# Patient Record
Sex: FEMALE | Race: WHITE | Employment: OTHER | URBAN - METROPOLITAN AREA
[De-identification: names, ages, dates, MRNs, and addresses within clinical notes are randomized per-mention and may not be internally consistent; named-entity substitution may affect disease eponyms.]

---

## 2017-05-02 PROBLEM — E78.2 MIXED HYPERLIPIDEMIA: Status: ACTIVE | Noted: 2017-05-02

## 2017-05-03 ENCOUNTER — HOSPITAL ENCOUNTER (OUTPATIENT)
Dept: OTHER | Age: 72
Discharge: HOME | End: 2017-05-03
Attending: INTERNAL MEDICINE | Admitting: INTERNAL MEDICINE

## 2017-05-03 ENCOUNTER — OFFICE VISIT (OUTPATIENT)
Dept: CARDIOLOGY CLINIC | Age: 72
End: 2017-05-03

## 2017-05-03 VITALS
OXYGEN SATURATION: 98 % | SYSTOLIC BLOOD PRESSURE: 200 MMHG | HEIGHT: 65 IN | WEIGHT: 152.08 LBS | DIASTOLIC BLOOD PRESSURE: 120 MMHG | HEART RATE: 72 BPM | BODY MASS INDEX: 25.34 KG/M2

## 2017-05-03 DIAGNOSIS — R00.2 PALPITATIONS: ICD-10-CM

## 2017-05-03 DIAGNOSIS — E78.2 MIXED HYPERLIPIDEMIA: Primary | ICD-10-CM

## 2017-05-03 DIAGNOSIS — I48.20 CHRONIC ATRIAL FIBRILLATION (HCC): ICD-10-CM

## 2017-05-03 DIAGNOSIS — I10 ESSENTIAL HYPERTENSION: ICD-10-CM

## 2017-05-03 DIAGNOSIS — Q21.12 PATENT FORAMEN OVALE: ICD-10-CM

## 2017-05-03 LAB
INR BLD: 1.02 (ref 0.85–1.15)
PROTHROMBIN TIME: 11.8 SEC (ref 9.6–13)

## 2017-05-03 PROCEDURE — 1036F TOBACCO NON-USER: CPT | Performed by: INTERNAL MEDICINE

## 2017-05-03 PROCEDURE — 4040F PNEUMOC VAC/ADMIN/RCVD: CPT | Performed by: INTERNAL MEDICINE

## 2017-05-03 PROCEDURE — 93000 ELECTROCARDIOGRAM COMPLETE: CPT | Performed by: INTERNAL MEDICINE

## 2017-05-03 PROCEDURE — 3017F COLORECTAL CA SCREEN DOC REV: CPT | Performed by: INTERNAL MEDICINE

## 2017-05-03 PROCEDURE — 1090F PRES/ABSN URINE INCON ASSESS: CPT | Performed by: INTERNAL MEDICINE

## 2017-05-03 PROCEDURE — 3014F SCREEN MAMMO DOC REV: CPT | Performed by: INTERNAL MEDICINE

## 2017-05-03 PROCEDURE — G8427 DOCREV CUR MEDS BY ELIG CLIN: HCPCS | Performed by: INTERNAL MEDICINE

## 2017-05-03 PROCEDURE — 99204 OFFICE O/P NEW MOD 45 MIN: CPT | Performed by: INTERNAL MEDICINE

## 2017-05-03 PROCEDURE — G8420 CALC BMI NORM PARAMETERS: HCPCS | Performed by: INTERNAL MEDICINE

## 2017-05-03 RX ORDER — LOSARTAN POTASSIUM 100 MG/1
100 TABLET ORAL DAILY
COMMUNITY
End: 2017-05-03 | Stop reason: ALTCHOICE

## 2017-05-03 RX ORDER — RANITIDINE 300 MG/1
300 TABLET ORAL NIGHTLY
COMMUNITY
End: 2017-12-20 | Stop reason: ALTCHOICE

## 2017-05-03 RX ORDER — METOPROLOL SUCCINATE 50 MG/1
50 TABLET, EXTENDED RELEASE ORAL DAILY
COMMUNITY
End: 2018-06-27

## 2017-05-03 RX ORDER — LOSARTAN POTASSIUM AND HYDROCHLOROTHIAZIDE 12.5; 1 MG/1; MG/1
1 TABLET ORAL DAILY
COMMUNITY
End: 2019-06-26 | Stop reason: SDUPTHER

## 2017-05-03 RX ORDER — WARFARIN SODIUM 5 MG/1
5 TABLET ORAL DAILY
Qty: 30 TABLET | Refills: 5 | Status: SHIPPED | OUTPATIENT
Start: 2017-05-03 | End: 2017-11-24 | Stop reason: SDUPTHER

## 2017-05-03 RX ORDER — ROPINIROLE 1 MG/1
1 TABLET, FILM COATED ORAL NIGHTLY
COMMUNITY

## 2017-05-08 ENCOUNTER — ANTI-COAG VISIT (OUTPATIENT)
Dept: PHARMACY | Facility: CLINIC | Age: 72
End: 2017-05-08

## 2017-05-08 DIAGNOSIS — I48.20 CHRONIC ATRIAL FIBRILLATION (HCC): ICD-10-CM

## 2017-05-08 LAB — INR BLD: 1.6

## 2017-05-15 ENCOUNTER — ANTI-COAG VISIT (OUTPATIENT)
Dept: PHARMACY | Facility: CLINIC | Age: 72
End: 2017-05-15

## 2017-05-15 ENCOUNTER — TELEPHONE (OUTPATIENT)
Dept: CARDIOLOGY CLINIC | Age: 72
End: 2017-05-15

## 2017-05-15 DIAGNOSIS — I48.20 CHRONIC ATRIAL FIBRILLATION (HCC): ICD-10-CM

## 2017-05-15 LAB — INR BLD: 1.6

## 2017-05-16 ENCOUNTER — HOSPITAL ENCOUNTER (OUTPATIENT)
Dept: NON INVASIVE DIAGNOSTICS | Age: 72
Discharge: HOME | End: 2017-05-16
Attending: INTERNAL MEDICINE | Admitting: INTERNAL MEDICINE

## 2017-05-16 ENCOUNTER — TELEPHONE (OUTPATIENT)
Dept: CARDIOLOGY CLINIC | Age: 72
End: 2017-05-16

## 2017-05-16 DIAGNOSIS — Q21.10 ATRIAL SEPTAL DEFECT: ICD-10-CM

## 2017-05-16 LAB
LV EF: 55 %
LVEF MODALITY: NORMAL

## 2017-05-18 LAB
ACQUISITION DURATION: NORMAL S
AVERAGE HEART RATE: 75 BPM
EKG DIAGNOSIS: NORMAL
HOLTER MAX HEART RATE: 138 BPM
HOOKUP DATE: NORMAL
HOOKUP TIME: NORMAL
Lab: NORMAL
MAX HEART RATE TIME/DATE: NORMAL
MIN HEART RATE TIME/DATE: NORMAL
MIN HEART RATE: 45 BPM
NUMBER OF QRS COMPLEXES: NORMAL
NUMBER OF SUPRAVENTRICULAR BEATS IN RUNS: 0
NUMBER OF SUPRAVENTRICULAR COUPLETS: 0
NUMBER OF SUPRAVENTRICULAR ECTOPICS: 0
NUMBER OF SUPRAVENTRICULAR ISOLATED BEATS: 0
NUMBER OF SUPRAVENTRICULAR RUNS: 0
NUMBER OF VENTRICULAR BEATS IN RUNS: 0
NUMBER OF VENTRICULAR BIGEMINAL CYCLES: 0
NUMBER OF VENTRICULAR COUPLETS: 7
NUMBER OF VENTRICULAR ECTOPICS: 441
NUMBER OF VENTRICULAR ISOLATED BEATS: 427
NUMBER OF VENTRICULAR RUNS: 0

## 2017-05-19 ENCOUNTER — TELEPHONE (OUTPATIENT)
Dept: CARDIOLOGY CLINIC | Age: 72
End: 2017-05-19

## 2017-05-31 ENCOUNTER — ANTI-COAG VISIT (OUTPATIENT)
Dept: PHARMACY | Facility: CLINIC | Age: 72
End: 2017-05-31

## 2017-05-31 LAB — INR BLD: 1.3

## 2017-06-05 ENCOUNTER — ANTI-COAG VISIT (OUTPATIENT)
Dept: PHARMACY | Facility: CLINIC | Age: 72
End: 2017-06-05

## 2017-06-05 DIAGNOSIS — I48.20 CHRONIC ATRIAL FIBRILLATION (HCC): ICD-10-CM

## 2017-06-05 LAB — INR BLD: 2.1

## 2017-06-19 ENCOUNTER — ANTI-COAG VISIT (OUTPATIENT)
Dept: PHARMACY | Facility: CLINIC | Age: 72
End: 2017-06-19

## 2017-06-19 LAB — INR BLD: 2.5

## 2017-06-21 ENCOUNTER — OFFICE VISIT (OUTPATIENT)
Dept: CARDIOLOGY CLINIC | Age: 72
End: 2017-06-21

## 2017-06-21 VITALS
BODY MASS INDEX: 24.99 KG/M2 | DIASTOLIC BLOOD PRESSURE: 90 MMHG | OXYGEN SATURATION: 97 % | SYSTOLIC BLOOD PRESSURE: 150 MMHG | HEART RATE: 81 BPM | HEIGHT: 65 IN | WEIGHT: 150 LBS

## 2017-06-21 DIAGNOSIS — R00.2 PALPITATIONS: ICD-10-CM

## 2017-06-21 DIAGNOSIS — I10 ESSENTIAL HYPERTENSION: ICD-10-CM

## 2017-06-21 DIAGNOSIS — I48.20 CHRONIC ATRIAL FIBRILLATION (HCC): ICD-10-CM

## 2017-06-21 DIAGNOSIS — Q21.12 PATENT FORAMEN OVALE: Primary | ICD-10-CM

## 2017-06-21 DIAGNOSIS — E78.2 MIXED HYPERLIPIDEMIA: ICD-10-CM

## 2017-06-21 PROCEDURE — G8420 CALC BMI NORM PARAMETERS: HCPCS | Performed by: INTERNAL MEDICINE

## 2017-06-21 PROCEDURE — 4040F PNEUMOC VAC/ADMIN/RCVD: CPT | Performed by: INTERNAL MEDICINE

## 2017-06-21 PROCEDURE — 1123F ACP DISCUSS/DSCN MKR DOCD: CPT | Performed by: INTERNAL MEDICINE

## 2017-06-21 PROCEDURE — G8399 PT W/DXA RESULTS DOCUMENT: HCPCS | Performed by: INTERNAL MEDICINE

## 2017-06-21 PROCEDURE — 99214 OFFICE O/P EST MOD 30 MIN: CPT | Performed by: INTERNAL MEDICINE

## 2017-06-21 PROCEDURE — 1036F TOBACCO NON-USER: CPT | Performed by: INTERNAL MEDICINE

## 2017-06-21 PROCEDURE — 3017F COLORECTAL CA SCREEN DOC REV: CPT | Performed by: INTERNAL MEDICINE

## 2017-06-21 PROCEDURE — G8427 DOCREV CUR MEDS BY ELIG CLIN: HCPCS | Performed by: INTERNAL MEDICINE

## 2017-06-21 PROCEDURE — 3014F SCREEN MAMMO DOC REV: CPT | Performed by: INTERNAL MEDICINE

## 2017-06-21 PROCEDURE — 1090F PRES/ABSN URINE INCON ASSESS: CPT | Performed by: INTERNAL MEDICINE

## 2017-07-17 ENCOUNTER — ANTI-COAG VISIT (OUTPATIENT)
Dept: PHARMACY | Facility: CLINIC | Age: 72
End: 2017-07-17

## 2017-07-17 LAB — INR BLD: 4.8

## 2017-07-26 ENCOUNTER — ANTI-COAG VISIT (OUTPATIENT)
Dept: PHARMACY | Facility: CLINIC | Age: 72
End: 2017-07-26

## 2017-07-26 LAB — INR BLD: 3.7

## 2017-08-02 ENCOUNTER — ANTI-COAG VISIT (OUTPATIENT)
Dept: PHARMACY | Facility: CLINIC | Age: 72
End: 2017-08-02

## 2017-08-02 LAB — INR BLD: 2.1

## 2017-08-16 ENCOUNTER — ANTI-COAG VISIT (OUTPATIENT)
Dept: PHARMACY | Facility: CLINIC | Age: 72
End: 2017-08-16

## 2017-08-16 DIAGNOSIS — I48.20 CHRONIC ATRIAL FIBRILLATION (HCC): ICD-10-CM

## 2017-08-16 LAB — INR BLD: 3.2

## 2017-08-30 ENCOUNTER — ANTI-COAG VISIT (OUTPATIENT)
Dept: PHARMACY | Facility: CLINIC | Age: 72
End: 2017-08-30

## 2017-08-30 DIAGNOSIS — I48.20 CHRONIC ATRIAL FIBRILLATION (HCC): ICD-10-CM

## 2017-08-30 LAB — INR BLD: 3.6

## 2017-09-13 ENCOUNTER — ANTI-COAG VISIT (OUTPATIENT)
Dept: PHARMACY | Facility: CLINIC | Age: 72
End: 2017-09-13

## 2017-09-13 DIAGNOSIS — I48.20 CHRONIC ATRIAL FIBRILLATION (HCC): ICD-10-CM

## 2017-09-13 LAB — INR BLD: 1.3

## 2017-09-20 ENCOUNTER — ANTI-COAG VISIT (OUTPATIENT)
Dept: PHARMACY | Facility: CLINIC | Age: 72
End: 2017-09-20

## 2017-09-20 DIAGNOSIS — I48.20 CHRONIC ATRIAL FIBRILLATION (HCC): ICD-10-CM

## 2017-09-20 LAB — INR BLD: 3.5

## 2017-09-21 ENCOUNTER — TELEPHONE (OUTPATIENT)
Dept: CARDIOLOGY CLINIC | Age: 72
End: 2017-09-21

## 2017-10-02 ENCOUNTER — ANTI-COAG VISIT (OUTPATIENT)
Dept: PHARMACY | Facility: CLINIC | Age: 72
End: 2017-10-02

## 2017-10-02 LAB — INR BLD: 2.4

## 2017-10-02 NOTE — PROGRESS NOTES
Ms. Eitan Gregorio is here for management of anticoagulation for A-fib. PMH also significant for HTN, HLD, AFIB with controlled rates, DVT years ago, varicose veins, GERD and anxiety. She presents today w/out complaint. Pt verifies dosing regimen as listed above. Pt denies s/s bruising/swelling/SOB. No BRBPR. No melena. Reviewed pt medication list.  No changes in RX/OTCs/Herbal medications. Reviewed dietary concerns  Denies tobacco use, occasional EtOH use. Started taking trazodone and zolpidem for sleep     INR 2.4 is within acceptable therapeutic range of 2-3. Recommend to continue warfarin 2.5 mg W and 5 mg daily all other days. Patient has 5 mg tablets. Will continue to monitor and check INR in 3 weeks. Dosing reminder card given with phone number, appointment date and time.    Return to clinic: 10/23 @ 9:15 am    Rodolfo Costa PharmD 10/2/2017 8:46 AM

## 2017-10-23 ENCOUNTER — ANTI-COAG VISIT (OUTPATIENT)
Dept: PHARMACY | Facility: CLINIC | Age: 72
End: 2017-10-23

## 2017-10-23 LAB — INR BLD: 1.9

## 2017-11-20 ENCOUNTER — ANTI-COAG VISIT (OUTPATIENT)
Dept: PHARMACY | Facility: CLINIC | Age: 72
End: 2017-11-20

## 2017-11-20 LAB — INR BLD: 1.9

## 2017-11-27 RX ORDER — WARFARIN SODIUM 5 MG/1
TABLET ORAL
Qty: 30 TABLET | Refills: 0 | Status: SHIPPED | OUTPATIENT
Start: 2017-11-27 | End: 2017-12-20 | Stop reason: ALTCHOICE

## 2017-12-07 ENCOUNTER — ANTI-COAG VISIT (OUTPATIENT)
Dept: PHARMACY | Facility: CLINIC | Age: 72
End: 2017-12-07

## 2017-12-07 PROBLEM — I48.20 CHRONIC ATRIAL FIBRILLATION (HCC): Status: RESOLVED | Noted: 2017-05-03 | Resolved: 2017-12-07

## 2017-12-07 NOTE — PROGRESS NOTES
Patient called clinic to inform us that she is now on Xarelto. Will discharge patient from [facility].  If she needs to return in the future, she will need new referral.

## 2017-12-20 ENCOUNTER — OFFICE VISIT (OUTPATIENT)
Dept: CARDIOLOGY CLINIC | Age: 72
End: 2017-12-20

## 2017-12-20 VITALS
BODY MASS INDEX: 24.84 KG/M2 | WEIGHT: 149.08 LBS | SYSTOLIC BLOOD PRESSURE: 118 MMHG | HEART RATE: 78 BPM | DIASTOLIC BLOOD PRESSURE: 86 MMHG | OXYGEN SATURATION: 94 % | HEIGHT: 65 IN

## 2017-12-20 DIAGNOSIS — I10 ESSENTIAL HYPERTENSION: Primary | ICD-10-CM

## 2017-12-20 DIAGNOSIS — R53.82 CHRONIC FATIGUE: ICD-10-CM

## 2017-12-20 DIAGNOSIS — E78.2 MIXED HYPERLIPIDEMIA: ICD-10-CM

## 2017-12-20 DIAGNOSIS — R00.2 PALPITATIONS: ICD-10-CM

## 2017-12-20 DIAGNOSIS — Q21.12 PATENT FORAMEN OVALE: ICD-10-CM

## 2017-12-20 DIAGNOSIS — I48.20 CHRONIC ATRIAL FIBRILLATION (HCC): ICD-10-CM

## 2017-12-20 LAB
BASOPHILS ABSOLUTE: NORMAL /ΜL
BASOPHILS RELATIVE PERCENT: NORMAL %
BUN BLDV-MCNC: 20 MG/DL
CALCIUM SERPL-MCNC: 9.3 MG/DL
CHLORIDE BLD-SCNC: 102 MMOL/L
CO2: 22 MMOL/L
CREAT SERPL-MCNC: 1.01 MG/DL
EOSINOPHILS ABSOLUTE: NORMAL /ΜL
EOSINOPHILS RELATIVE PERCENT: NORMAL %
GFR CALCULATED: NORMAL
GLUCOSE BLD-MCNC: 107 MG/DL
HCT VFR BLD CALC: 42.5 % (ref 36–46)
HEMOGLOBIN: 14.3 G/DL (ref 12–16)
LYMPHOCYTES ABSOLUTE: NORMAL /ΜL
LYMPHOCYTES RELATIVE PERCENT: NORMAL %
MCH RBC QN AUTO: 30.1 PG
MCHC RBC AUTO-ENTMCNC: 33.6 G/DL
MCV RBC AUTO: 90 FL
MONOCYTES ABSOLUTE: NORMAL /ΜL
MONOCYTES RELATIVE PERCENT: NORMAL %
NEUTROPHILS ABSOLUTE: NORMAL /ΜL
NEUTROPHILS RELATIVE PERCENT: NORMAL %
PLATELET # BLD: 277 K/ΜL
PMV BLD AUTO: NORMAL FL
POTASSIUM SERPL-SCNC: 4.3 MMOL/L
RBC # BLD: NORMAL 10^6/ΜL
SODIUM BLD-SCNC: 142 MMOL/L
WBC # BLD: 7.4 10^3/ML

## 2017-12-20 PROCEDURE — 1090F PRES/ABSN URINE INCON ASSESS: CPT | Performed by: INTERNAL MEDICINE

## 2017-12-20 PROCEDURE — G8427 DOCREV CUR MEDS BY ELIG CLIN: HCPCS | Performed by: INTERNAL MEDICINE

## 2017-12-20 PROCEDURE — G8399 PT W/DXA RESULTS DOCUMENT: HCPCS | Performed by: INTERNAL MEDICINE

## 2017-12-20 PROCEDURE — 3014F SCREEN MAMMO DOC REV: CPT | Performed by: INTERNAL MEDICINE

## 2017-12-20 PROCEDURE — G8420 CALC BMI NORM PARAMETERS: HCPCS | Performed by: INTERNAL MEDICINE

## 2017-12-20 PROCEDURE — 3017F COLORECTAL CA SCREEN DOC REV: CPT | Performed by: INTERNAL MEDICINE

## 2017-12-20 PROCEDURE — 1036F TOBACCO NON-USER: CPT | Performed by: INTERNAL MEDICINE

## 2017-12-20 PROCEDURE — 1123F ACP DISCUSS/DSCN MKR DOCD: CPT | Performed by: INTERNAL MEDICINE

## 2017-12-20 PROCEDURE — 4040F PNEUMOC VAC/ADMIN/RCVD: CPT | Performed by: INTERNAL MEDICINE

## 2017-12-20 PROCEDURE — G8484 FLU IMMUNIZE NO ADMIN: HCPCS | Performed by: INTERNAL MEDICINE

## 2017-12-20 PROCEDURE — 99214 OFFICE O/P EST MOD 30 MIN: CPT | Performed by: INTERNAL MEDICINE

## 2017-12-20 RX ORDER — OMEPRAZOLE 20 MG/1
40 CAPSULE, DELAYED RELEASE ORAL EVERY OTHER DAY
COMMUNITY

## 2017-12-20 NOTE — LETTER
Sleep        No facility-administered encounter medications on file as of 12/20/2017. Lab Data:  CBC: No results for input(s): WBC, HGB, HCT, MCV, PLT in the last 72 hours. BMP: No results for input(s): NA, K, CL, CO2, PHOS, BUN, CREATININE in the last 72 hours. Invalid input(s): CA  LIVER PROFILE: No results for input(s): AST, ALT, LIPASE, BILIDIR, BILITOT, ALKPHOS in the last 72 hours. Invalid input(s): AMYLASE,  ALB  LIPID: No results found for: CHOL  No results found for: TRIG  No results found for: HDL  No results found for: LDLCHOLESTEROL, LDLCALC  No results found for: LABVLDL, VLDL  No results found for: CHOLHDLRATIO  PT/INR:   No results for input(s): PROTIME, INR in the last 72 hours. A1C: No results found for: LABA1C  BNP:  No results for input(s): BNP in the last 72 hours. IMAGING:   ECHO 5/15/17  Summary   Normal LV systolic function with an estimated LVEF of 55%.   Sigmoid-shaped basal septum.   Mild concentric LVH.   Elevated left ventricular diastolic filling pressure.   The left atrium is mildly dilated.   The right atrium is moderately dilated.   Mild mitral annular calcification.   Mild mitral regurgitation.   Aortic valve appears tricuspid and sclerotic but opens adequately.   Moderate-severe tricuspid regurgitation.   Systolic pulmonary artery pressure (SPAP) is normal and estimated at 32 mmHg   (RA pressure 8 mmHg). EKG 5/3/17 AFIB with controlled VR 75 bpm    Assessment:  May was seen today for cardiology follow up hx of AFIB, HTN, HLD, shortness of breath and fatigue. Diagnoses and all orders for this visit:  Fatigue  Mixed hyperlipidemia    Essential hypertension white coat syndrome    Palpitations due to afib    Patent foramen ovale s/p closure    Chronic atrial fibrillation (HCC)      CHADSVAS score 3    Plan:  Orders Placed This Encounter   Procedures    CBC    TSH WITH REFLEX TO FT4   meds reviewed and refilled   1. Scheduled return visit.  6 months

## 2017-12-20 NOTE — COMMUNICATION BODY
nonicteric  Nose/Sinuses:  negative findings: nose shows no deformity, asymmetry, or inflammation, nasal mucosa normal, septum midline with no perforation or bleeding  Back:  no pain to palpation  Joint:  no active joint inflammation  Musculoskeletal:  negative  Skin:  Warm and dry  Neck:  Negative for JVD and Carotid Bruits. Chest:  Clear to auscultation, respiration easy  Cardiovascular:  Irreg irreg, 76 bpm S1S2 normal, no murmur, no rub or thrill. Abdomen:  Soft normal liver and spleen  Extremities:   No edema, clubbing, cyanosis,superficial varicose veins in legs  Pulses: Femoral and pedal pulses are normal.  Neuro: intact    Medications:   Outpatient Encounter Prescriptions as of 12/20/2017   Medication Sig Dispense Refill    Rivaroxaban (XARELTO PO) Take by mouth daily      omeprazole (PRILOSEC) 20 MG delayed release capsule Take 20 mg by mouth daily Dr Mayte López rx      TRAZODONE HCL PO Take by mouth daily Takes 2 tabs at night      rOPINIRole (REQUIP) 1 MG tablet Take 1 mg by mouth nightly      metoprolol succinate (TOPROL XL) 50 MG extended release tablet Take 50 mg by mouth daily      losartan-hydrochlorothiazide (HYZAAR) 100-12.5 MG per tablet Take 1 tablet by mouth daily      warfarin (COUMADIN) 5 MG tablet TAKE (1) TABLET DAILY 30 tablet 0    [DISCONTINUED] ranitidine (ZANTAC) 300 MG tablet Take 300 mg by mouth nightly      zolpidem (AMBIEN) 10 MG tablet Take 5 mg by mouth nightly as needed for Sleep        No facility-administered encounter medications on file as of 12/20/2017. Lab Data:  CBC: No results for input(s): WBC, HGB, HCT, MCV, PLT in the last 72 hours. BMP: No results for input(s): NA, K, CL, CO2, PHOS, BUN, CREATININE in the last 72 hours. Invalid input(s): CA  LIVER PROFILE: No results for input(s): AST, ALT, LIPASE, BILIDIR, BILITOT, ALKPHOS in the last 72 hours. Invalid input(s):   AMYLASE,  ALB  LIPID: No results found for: CHOL  No results found for: TRIG  No

## 2017-12-21 ENCOUNTER — TELEPHONE (OUTPATIENT)
Dept: CARDIOLOGY CLINIC | Age: 72
End: 2017-12-21

## 2017-12-21 LAB
BASOPHILS ABSOLUTE: NORMAL /ΜL
BASOPHILS RELATIVE PERCENT: NORMAL %
EOSINOPHILS ABSOLUTE: NORMAL /ΜL
EOSINOPHILS RELATIVE PERCENT: NORMAL %
HCT VFR BLD CALC: 42.5 % (ref 36–46)
HEMOGLOBIN: 14.3 G/DL (ref 12–16)
LYMPHOCYTES ABSOLUTE: NORMAL /ΜL
LYMPHOCYTES RELATIVE PERCENT: NORMAL %
MCH RBC QN AUTO: 30.1 PG
MCHC RBC AUTO-ENTMCNC: 33.6 G/DL
MCV RBC AUTO: 90 FL
MONOCYTES ABSOLUTE: NORMAL /ΜL
MONOCYTES RELATIVE PERCENT: NORMAL %
NEUTROPHILS ABSOLUTE: NORMAL /ΜL
NEUTROPHILS RELATIVE PERCENT: NORMAL %
PLATELET # BLD: 277 K/ΜL
PMV BLD AUTO: NORMAL FL
RBC # BLD: 4.75 10^6/ΜL
WBC # BLD: 7.4 10^3/ML

## 2017-12-22 ENCOUNTER — TELEPHONE (OUTPATIENT)
Dept: CARDIOLOGY CLINIC | Age: 72
End: 2017-12-22

## 2018-06-27 ENCOUNTER — OFFICE VISIT (OUTPATIENT)
Dept: CARDIOLOGY CLINIC | Age: 73
End: 2018-06-27

## 2018-06-27 VITALS
WEIGHT: 149 LBS | SYSTOLIC BLOOD PRESSURE: 134 MMHG | HEIGHT: 65 IN | OXYGEN SATURATION: 95 % | HEART RATE: 70 BPM | BODY MASS INDEX: 24.83 KG/M2 | DIASTOLIC BLOOD PRESSURE: 94 MMHG

## 2018-06-27 DIAGNOSIS — I48.20 CHRONIC ATRIAL FIBRILLATION (HCC): Primary | ICD-10-CM

## 2018-06-27 DIAGNOSIS — I10 ESSENTIAL HYPERTENSION: ICD-10-CM

## 2018-06-27 DIAGNOSIS — E78.2 MIXED HYPERLIPIDEMIA: ICD-10-CM

## 2018-06-27 PROCEDURE — 99214 OFFICE O/P EST MOD 30 MIN: CPT | Performed by: INTERNAL MEDICINE

## 2018-06-27 PROCEDURE — G8420 CALC BMI NORM PARAMETERS: HCPCS | Performed by: INTERNAL MEDICINE

## 2018-06-27 PROCEDURE — 4040F PNEUMOC VAC/ADMIN/RCVD: CPT | Performed by: INTERNAL MEDICINE

## 2018-06-27 PROCEDURE — 1090F PRES/ABSN URINE INCON ASSESS: CPT | Performed by: INTERNAL MEDICINE

## 2018-06-27 PROCEDURE — G8399 PT W/DXA RESULTS DOCUMENT: HCPCS | Performed by: INTERNAL MEDICINE

## 2018-06-27 PROCEDURE — G8427 DOCREV CUR MEDS BY ELIG CLIN: HCPCS | Performed by: INTERNAL MEDICINE

## 2018-06-27 PROCEDURE — 3017F COLORECTAL CA SCREEN DOC REV: CPT | Performed by: INTERNAL MEDICINE

## 2018-06-27 PROCEDURE — 1036F TOBACCO NON-USER: CPT | Performed by: INTERNAL MEDICINE

## 2018-06-27 PROCEDURE — 1123F ACP DISCUSS/DSCN MKR DOCD: CPT | Performed by: INTERNAL MEDICINE

## 2018-06-29 LAB
CHOLESTEROL, TOTAL: 214 MG/DL
CHOLESTEROL/HDL RATIO: NORMAL
HDLC SERPL-MCNC: 52 MG/DL (ref 35–70)
LDL CHOLESTEROL CALCULATED: 132 MG/DL (ref 0–160)
TRIGL SERPL-MCNC: 151 MG/DL
VLDLC SERPL CALC-MCNC: 30 MG/DL

## 2018-07-05 RX ORDER — ATORVASTATIN CALCIUM 10 MG/1
10 TABLET, FILM COATED ORAL DAILY
Qty: 30 TABLET | Refills: 5 | Status: SHIPPED | OUTPATIENT
Start: 2018-07-05 | End: 2019-06-26 | Stop reason: SDUPTHER

## 2018-07-06 ENCOUNTER — TELEPHONE (OUTPATIENT)
Dept: CARDIOLOGY CLINIC | Age: 73
End: 2018-07-06

## 2018-07-06 NOTE — TELEPHONE ENCOUNTER
----- Message from Floyd Forrester MD sent at 7/5/2018  4:14 PM EDT -----  Cholesterol level is high  I sent a prescription to her drug store.

## 2019-03-04 ENCOUNTER — HOSPITAL ENCOUNTER (OUTPATIENT)
Dept: ULTRASOUND IMAGING | Age: 74
Discharge: HOME | End: 2019-03-04
Payer: MEDICARE

## 2019-03-04 DIAGNOSIS — R10.13 EPIGASTRIC PAIN: ICD-10-CM

## 2019-03-04 PROCEDURE — 76705 ECHO EXAM OF ABDOMEN: CPT

## 2019-06-25 NOTE — PROGRESS NOTES
[address] Office Note  6/26/2019      Subjective:  Ms. Elza Lala is being seen for  HTN, HLD, persistent AFIB  She passed out 3 days ago    Nikolai:  Today she reports on 6/23/19 she woke up to use bathroom. She was bearing down to have BM. She was lightheaded went to the kitchen for a cookie. She passed out and slid down the cabinets. She reports a couple brief episodes  of CP lasting seconds. Came on at rest  within the last 2 weeks. Denies , shortness of breath, edema, dizziness, palpitations and. She reports a lot of generalized anxiety and trazodone has not helped. She isnot able to sleep requesting help. PMH   HTN, HLD, AFIB with controlled rates, DVT year ago, varicose veins, GERD and anxiety. She reports for years having irregular heart beat. She reports s/p PFO 30 years ago open surgery with pericardial patch by Dr Aneesh Cleveland. She [facility].       Review of Systems:  12 point ROS negative in all areas as listed below except as in [address], EENT, Cardiovascular, pulmonary, GI, , Musculoskeletal, skin, neurological, hematological, endocrine, Psychiatric      Reviewed past medical history, social, and family history. , non smoker, glass wine nightly, No CAD in Parents Dad alcoholic  She had 7 children.  Brother and sister AFIB  Past Medical History:   Diagnosis Date    Anxiety     GERD (gastroesophageal reflux disease)     Hypertension     Insomnia      Past Surgical History:   Procedure Laterality Date    CARDIAC SURGERY  1986    open heart for hole in heart    COLONOSCOPY      DILATION AND CURETTAGE OF UTERUS      UPPER GASTROINTESTINAL ENDOSCOPY  12/2/2014    VASCULAR SURGERY      r leg       Objective:   BP (!) 148/90 (Position: Standing)   Pulse 91   Ht 5' 5\" (1.651 m)   Wt 146 lb 6.4 oz (66.4 kg)   SpO2 98%   BMI 24.36 kg/m²     Wt Readings from Last 3 Encounters:   06/26/19 146 lb 6.4 oz (66.4 kg) 06/27/18 149 lb (67.6 kg)   12/20/17 149 lb 1.3 oz (67.6 kg)       Physical Exam:  General: No Respiratory distress, appears well developed and well nourished. Eyes:  Sclera nonicteric  Nose/Sinuses:  negative findings: nose shows no deformity, asymmetry, or inflammation, nasal mucosa normal, septum midline with no perforation or bleeding  Back:  no pain to palpation  Joint:  no active joint inflammation  Musculoskeletal:  negative  Skin:  Warm and dry  Neck:  Negative for JVD and Carotid Bruits. Chest:  Clear to auscultation, respiration easy  Cardiovascular:  Irreg irreg, 76 bpm S1S2 normal, no murmur, no rub or thrill. Abdomen:  Soft normal liver and spleen  Extremities:   No edema, clubbing, cyanosis,superficial varicose veins in legs  Pulses: Femoral and pedal pulses are normal.  Neuro: intact    Medications:   Outpatient Encounter Medications as of 6/26/2019   Medication Sig Dispense Refill    Prenatal Multivit-Min-Fe-FA (PRENATAL VITAMINS PO) Take by mouth      mirtazapine (REMERON) 15 MG tablet Take 1 tablet by mouth nightly 30 tablet 1    atorvastatin (LIPITOR) 10 MG tablet Take 1 tablet by mouth daily 90 tablet 3    losartan-hydrochlorothiazide (HYZAAR) 100-12.5 MG per tablet Take 1 tablet by mouth daily 90 tablet 3    rivaroxaban (XARELTO) 15 MG TABS tablet Take 1 tablet by mouth daily 30 tablet 11    omeprazole (PRILOSEC) 20 MG delayed release capsule Take 20 mg by mouth daily Dr Lynn Barba rx      rOPINIRole (REQUIP) 1 MG tablet Take 1 mg by mouth nightly      [DISCONTINUED] atorvastatin (LIPITOR) 10 MG tablet Take 1 tablet by mouth daily 30 tablet 5    [DISCONTINUED] TRAZODONE HCL PO Take by mouth daily Takes 2 tabs at night      [DISCONTINUED] losartan-hydrochlorothiazide (HYZAAR) 100-12.5 MG per tablet Take 1 tablet by mouth daily       No facility-administered encounter medications on file as of 6/26/2019.          Lab Data:  CBC: No results for input(s): WBC, HGB, HCT, MCV, PLT in the syndrome    Palpitations resolved    Patent foramen ovale s/p patch closure    Chronic atrial fibrillation (HCC)      CHADSVAS score 3    Plan: 1. Christian Keel in view of syncope and CP with abnormal EKG showing anterior wall ischemia  2. Start Mirtazipine 15mg nightly for anxiety. Stop taking trazodone   3. Obtain labs from health source   4. Follow up in 6 months   Hydrate well yourself   Avoid caffeine   If episodes of dizziness sit down  Avoid straining during BM  CBC AMP LIPIDs OK from 6.19.19  This note was scribed in the presence of Annamaria Flores MD by Joycelyn Dsouza RN      QUALITY MEASURES  1. Tobacco Cessation Counseling: NA  2. Retake of BP if >140/90:   Yes  3. Documentation to PCP/referring for new patient:  Sent to PCP at close of office visit  4. CAD patient on anti-platelet: yes  5. CAD patient on STATIN therapy:  No pt refuses  6. Patient with CHF and aFib on anticoagulation: yes  I, Dr. Fannie Green, personally performed the services described in this documentation, as scribed by the above signed scribe in my presence. It is both accurate and complete to my knowledge. I agree with the details independently gathered by the clinical support staff, while the remaining scribed note accurately describes my personal service to the patient.       [address] [place], MD 6/26/2019 1:22 PM

## 2019-06-26 ENCOUNTER — OFFICE VISIT (OUTPATIENT)
Dept: CARDIOLOGY CLINIC | Age: 74
End: 2019-06-26
Payer: MEDICARE

## 2019-06-26 VITALS
WEIGHT: 146.4 LBS | DIASTOLIC BLOOD PRESSURE: 90 MMHG | SYSTOLIC BLOOD PRESSURE: 148 MMHG | HEART RATE: 91 BPM | HEIGHT: 65 IN | BODY MASS INDEX: 24.39 KG/M2 | OXYGEN SATURATION: 98 %

## 2019-06-26 DIAGNOSIS — E78.2 MIXED HYPERLIPIDEMIA: ICD-10-CM

## 2019-06-26 DIAGNOSIS — R55 SYNCOPE, UNSPECIFIED SYNCOPE TYPE: Primary | ICD-10-CM

## 2019-06-26 DIAGNOSIS — R94.31 ABNORMAL EKG: ICD-10-CM

## 2019-06-26 DIAGNOSIS — I48.20 CHRONIC ATRIAL FIBRILLATION (HCC): ICD-10-CM

## 2019-06-26 DIAGNOSIS — I10 ESSENTIAL HYPERTENSION: ICD-10-CM

## 2019-06-26 DIAGNOSIS — R07.89 OTHER CHEST PAIN: ICD-10-CM

## 2019-06-26 PROCEDURE — 4040F PNEUMOC VAC/ADMIN/RCVD: CPT | Performed by: INTERNAL MEDICINE

## 2019-06-26 PROCEDURE — 99214 OFFICE O/P EST MOD 30 MIN: CPT | Performed by: INTERNAL MEDICINE

## 2019-06-26 PROCEDURE — G8427 DOCREV CUR MEDS BY ELIG CLIN: HCPCS | Performed by: INTERNAL MEDICINE

## 2019-06-26 PROCEDURE — G8420 CALC BMI NORM PARAMETERS: HCPCS | Performed by: INTERNAL MEDICINE

## 2019-06-26 PROCEDURE — 93000 ELECTROCARDIOGRAM COMPLETE: CPT | Performed by: INTERNAL MEDICINE

## 2019-06-26 PROCEDURE — 1090F PRES/ABSN URINE INCON ASSESS: CPT | Performed by: INTERNAL MEDICINE

## 2019-06-26 PROCEDURE — 3017F COLORECTAL CA SCREEN DOC REV: CPT | Performed by: INTERNAL MEDICINE

## 2019-06-26 PROCEDURE — G8399 PT W/DXA RESULTS DOCUMENT: HCPCS | Performed by: INTERNAL MEDICINE

## 2019-06-26 PROCEDURE — 1123F ACP DISCUSS/DSCN MKR DOCD: CPT | Performed by: INTERNAL MEDICINE

## 2019-06-26 PROCEDURE — 1036F TOBACCO NON-USER: CPT | Performed by: INTERNAL MEDICINE

## 2019-06-26 RX ORDER — LOSARTAN POTASSIUM AND HYDROCHLOROTHIAZIDE 12.5; 1 MG/1; MG/1
1 TABLET ORAL DAILY
Qty: 90 TABLET | Refills: 3 | Status: SHIPPED | OUTPATIENT
Start: 2019-06-26 | End: 2020-05-13 | Stop reason: SDUPTHER

## 2019-06-26 RX ORDER — MIRTAZAPINE 15 MG/1
15 TABLET, FILM COATED ORAL NIGHTLY
Qty: 30 TABLET | Refills: 1 | Status: SHIPPED | OUTPATIENT
Start: 2019-06-26 | End: 2019-12-04 | Stop reason: ALTCHOICE

## 2019-06-26 RX ORDER — ATORVASTATIN CALCIUM 10 MG/1
10 TABLET, FILM COATED ORAL DAILY
Qty: 90 TABLET | Refills: 3 | Status: SHIPPED | OUTPATIENT
Start: 2019-06-26 | End: 2020-11-23 | Stop reason: SDUPTHER

## 2019-06-26 NOTE — PATIENT INSTRUCTIONS
Plan: 1. Luwanna He in view of syncope and CP. 2.  Start Mirtazipine 15mg nightly for anxiety. Stop taking trazodone   3. Obtain labs from health source   4.  Follow up in 6 months

## 2019-07-10 RX ORDER — RIVAROXABAN 15 MG/1
TABLET, FILM COATED ORAL
Qty: 90 TABLET | Refills: 3 | Status: SHIPPED | OUTPATIENT
Start: 2019-07-10 | End: 2020-05-13

## 2019-10-03 ENCOUNTER — HOSPITAL ENCOUNTER (OUTPATIENT)
Dept: MAMMOGRAPHY | Age: 74
Discharge: HOME | End: 2019-10-03
Payer: MEDICARE

## 2019-10-03 ENCOUNTER — HOSPITAL ENCOUNTER (OUTPATIENT)
Dept: GENERAL RADIOLOGY | Age: 74
Discharge: HOME | End: 2019-10-03
Payer: MEDICARE

## 2019-10-03 DIAGNOSIS — Z78.0 ASYMPTOMATIC AGE-RELATED POSTMENOPAUSAL STATE: ICD-10-CM

## 2019-10-03 DIAGNOSIS — Z12.31 SCREENING MAMMOGRAM, ENCOUNTER FOR: ICD-10-CM

## 2019-10-03 PROCEDURE — 77080 DXA BONE DENSITY AXIAL: CPT

## 2019-10-03 PROCEDURE — 77067 SCR MAMMO BI INCL CAD: CPT

## 2019-10-23 ENCOUNTER — OFFICE VISIT (OUTPATIENT)
Dept: ORTHOPEDIC SURGERY | Age: 74
End: 2019-10-23
Payer: MEDICARE

## 2019-10-23 VITALS — HEIGHT: 65 IN | WEIGHT: 146.39 LBS | BODY MASS INDEX: 24.39 KG/M2 | RESPIRATION RATE: 12 BRPM

## 2019-10-23 DIAGNOSIS — M25.562 CHRONIC PAIN OF LEFT KNEE: Primary | ICD-10-CM

## 2019-10-23 DIAGNOSIS — G89.29 CHRONIC PAIN OF LEFT KNEE: Primary | ICD-10-CM

## 2019-10-23 PROCEDURE — 99203 OFFICE O/P NEW LOW 30 MIN: CPT | Performed by: ORTHOPAEDIC SURGERY

## 2019-10-23 PROCEDURE — 3017F COLORECTAL CA SCREEN DOC REV: CPT | Performed by: ORTHOPAEDIC SURGERY

## 2019-10-23 PROCEDURE — G8420 CALC BMI NORM PARAMETERS: HCPCS | Performed by: ORTHOPAEDIC SURGERY

## 2019-10-23 PROCEDURE — 1036F TOBACCO NON-USER: CPT | Performed by: ORTHOPAEDIC SURGERY

## 2019-10-23 PROCEDURE — G8427 DOCREV CUR MEDS BY ELIG CLIN: HCPCS | Performed by: ORTHOPAEDIC SURGERY

## 2019-10-23 PROCEDURE — 1123F ACP DISCUSS/DSCN MKR DOCD: CPT | Performed by: ORTHOPAEDIC SURGERY

## 2019-10-23 PROCEDURE — G8399 PT W/DXA RESULTS DOCUMENT: HCPCS | Performed by: ORTHOPAEDIC SURGERY

## 2019-10-23 PROCEDURE — 1090F PRES/ABSN URINE INCON ASSESS: CPT | Performed by: ORTHOPAEDIC SURGERY

## 2019-10-23 PROCEDURE — 20610 DRAIN/INJ JOINT/BURSA W/O US: CPT | Performed by: ORTHOPAEDIC SURGERY

## 2019-10-23 PROCEDURE — 4040F PNEUMOC VAC/ADMIN/RCVD: CPT | Performed by: ORTHOPAEDIC SURGERY

## 2019-10-23 PROCEDURE — G8484 FLU IMMUNIZE NO ADMIN: HCPCS | Performed by: ORTHOPAEDIC SURGERY

## 2019-10-23 RX ORDER — METHYLPREDNISOLONE ACETATE 40 MG/ML
40 INJECTION, SUSPENSION INTRA-ARTICULAR; INTRALESIONAL; INTRAMUSCULAR; SOFT TISSUE ONCE
Status: COMPLETED | OUTPATIENT
Start: 2019-10-23 | End: 2019-10-23

## 2019-10-23 RX ADMIN — METHYLPREDNISOLONE ACETATE 40 MG: 40 INJECTION, SUSPENSION INTRA-ARTICULAR; INTRALESIONAL; INTRAMUSCULAR; SOFT TISSUE at 09:30

## 2019-12-04 ENCOUNTER — OFFICE VISIT (OUTPATIENT)
Dept: CARDIOLOGY CLINIC | Age: 74
End: 2019-12-04
Payer: MEDICARE

## 2019-12-04 VITALS
BODY MASS INDEX: 23.66 KG/M2 | OXYGEN SATURATION: 99 % | SYSTOLIC BLOOD PRESSURE: 164 MMHG | HEART RATE: 63 BPM | HEIGHT: 65 IN | WEIGHT: 142 LBS | DIASTOLIC BLOOD PRESSURE: 80 MMHG

## 2019-12-04 DIAGNOSIS — I48.20 CHRONIC ATRIAL FIBRILLATION (HCC): ICD-10-CM

## 2019-12-04 DIAGNOSIS — E78.2 MIXED HYPERLIPIDEMIA: ICD-10-CM

## 2019-12-04 DIAGNOSIS — I10 ESSENTIAL HYPERTENSION: Primary | ICD-10-CM

## 2019-12-04 PROCEDURE — 1036F TOBACCO NON-USER: CPT | Performed by: INTERNAL MEDICINE

## 2019-12-04 PROCEDURE — G8399 PT W/DXA RESULTS DOCUMENT: HCPCS | Performed by: INTERNAL MEDICINE

## 2019-12-04 PROCEDURE — 4040F PNEUMOC VAC/ADMIN/RCVD: CPT | Performed by: INTERNAL MEDICINE

## 2019-12-04 PROCEDURE — G8427 DOCREV CUR MEDS BY ELIG CLIN: HCPCS | Performed by: INTERNAL MEDICINE

## 2019-12-04 PROCEDURE — G8484 FLU IMMUNIZE NO ADMIN: HCPCS | Performed by: INTERNAL MEDICINE

## 2019-12-04 PROCEDURE — G8420 CALC BMI NORM PARAMETERS: HCPCS | Performed by: INTERNAL MEDICINE

## 2019-12-04 PROCEDURE — 1123F ACP DISCUSS/DSCN MKR DOCD: CPT | Performed by: INTERNAL MEDICINE

## 2019-12-04 PROCEDURE — 3017F COLORECTAL CA SCREEN DOC REV: CPT | Performed by: INTERNAL MEDICINE

## 2019-12-04 PROCEDURE — 99214 OFFICE O/P EST MOD 30 MIN: CPT | Performed by: INTERNAL MEDICINE

## 2019-12-04 PROCEDURE — 1090F PRES/ABSN URINE INCON ASSESS: CPT | Performed by: INTERNAL MEDICINE

## 2019-12-04 RX ORDER — AMLODIPINE BESYLATE 5 MG/1
5 TABLET ORAL DAILY
Qty: 30 TABLET | Refills: 11 | Status: SHIPPED | OUTPATIENT
Start: 2019-12-04 | End: 2020-11-23 | Stop reason: SDUPTHER

## 2020-01-29 ENCOUNTER — OFFICE VISIT (OUTPATIENT)
Dept: ORTHOPEDIC SURGERY | Age: 75
End: 2020-01-29
Payer: MEDICARE

## 2020-01-29 PROCEDURE — 20610 DRAIN/INJ JOINT/BURSA W/O US: CPT | Performed by: ORTHOPAEDIC SURGERY

## 2020-01-29 RX ORDER — PREDNISONE 1 MG/1
TABLET ORAL
Qty: 55 TABLET | Refills: 0 | Status: SHIPPED | OUTPATIENT
Start: 2020-01-29 | End: 2020-05-13 | Stop reason: ALTCHOICE

## 2020-01-29 RX ORDER — METHYLPREDNISOLONE ACETATE 40 MG/ML
40 INJECTION, SUSPENSION INTRA-ARTICULAR; INTRALESIONAL; INTRAMUSCULAR; SOFT TISSUE ONCE
Status: COMPLETED | OUTPATIENT
Start: 2020-01-29 | End: 2020-01-29

## 2020-01-29 RX ADMIN — METHYLPREDNISOLONE ACETATE 40 MG: 40 INJECTION, SUSPENSION INTRA-ARTICULAR; INTRALESIONAL; INTRAMUSCULAR; SOFT TISSUE at 09:09

## 2020-02-05 ENCOUNTER — OFFICE VISIT (OUTPATIENT)
Dept: ORTHOPEDIC SURGERY | Age: 75
End: 2020-02-05
Payer: MEDICARE

## 2020-02-05 VITALS — HEIGHT: 65 IN | WEIGHT: 141.98 LBS | RESPIRATION RATE: 12 BRPM | BODY MASS INDEX: 23.65 KG/M2

## 2020-02-05 PROBLEM — M54.32 LEFT SIDED SCIATICA: Status: ACTIVE | Noted: 2020-02-05

## 2020-02-05 PROBLEM — M51.36 DDD (DEGENERATIVE DISC DISEASE), LUMBAR: Status: ACTIVE | Noted: 2020-02-05

## 2020-02-05 PROBLEM — M79.605 PAIN OF LEFT LOWER EXTREMITY: Status: ACTIVE | Noted: 2020-02-05

## 2020-02-05 PROCEDURE — 1036F TOBACCO NON-USER: CPT | Performed by: ORTHOPAEDIC SURGERY

## 2020-02-05 PROCEDURE — 4040F PNEUMOC VAC/ADMIN/RCVD: CPT | Performed by: ORTHOPAEDIC SURGERY

## 2020-02-05 PROCEDURE — 3017F COLORECTAL CA SCREEN DOC REV: CPT | Performed by: ORTHOPAEDIC SURGERY

## 2020-02-05 PROCEDURE — G8420 CALC BMI NORM PARAMETERS: HCPCS | Performed by: ORTHOPAEDIC SURGERY

## 2020-02-05 PROCEDURE — 1090F PRES/ABSN URINE INCON ASSESS: CPT | Performed by: ORTHOPAEDIC SURGERY

## 2020-02-05 PROCEDURE — G8484 FLU IMMUNIZE NO ADMIN: HCPCS | Performed by: ORTHOPAEDIC SURGERY

## 2020-02-05 PROCEDURE — 1123F ACP DISCUSS/DSCN MKR DOCD: CPT | Performed by: ORTHOPAEDIC SURGERY

## 2020-02-05 PROCEDURE — G8427 DOCREV CUR MEDS BY ELIG CLIN: HCPCS | Performed by: ORTHOPAEDIC SURGERY

## 2020-02-05 PROCEDURE — 99214 OFFICE O/P EST MOD 30 MIN: CPT | Performed by: ORTHOPAEDIC SURGERY

## 2020-02-05 PROCEDURE — G8399 PT W/DXA RESULTS DOCUMENT: HCPCS | Performed by: ORTHOPAEDIC SURGERY

## 2020-02-05 RX ORDER — TIZANIDINE 4 MG/1
4 TABLET ORAL 3 TIMES DAILY
Qty: 90 TABLET | Refills: 1 | Status: SHIPPED
Start: 2020-02-05 | End: 2020-05-13 | Stop reason: ALTCHOICE

## 2020-02-05 NOTE — PROGRESS NOTES
KNEE VISIT      HISTORY OF PRESENT ILLNESS    Bryson Thomas is a 69 y.o. female who presents for reevaluation. She is visiting treatment given at her last visit has done very well and her knee was fine but when she went to get out of bed on February 1 she fell and injured her lateral side of her left thigh. Now she has pain goes down the left sciatic nerve distribution to her ankle. She grades her pain 10/10 despite taking  steroids at this time. ROS    Well-documented patient history form dated 2/5/2020  All other ROS negative except for above. Past Surgical history    Past Surgical History:   Procedure Laterality Date   [address]    open heart for hole in heart    COLONOSCOPY      DILATION AND CURETTAGE OF UTERUS      UPPER GASTROINTESTINAL ENDOSCOPY  12/2/2014    VASCULAR SURGERY      r leg       PAST MEDICAL    Past Medical History:   Diagnosis Date    Anxiety     GERD (gastroesophageal reflux disease)     Hypertension     Insomnia        Allergies    Allergies   Allergen Reactions    Iv Dye [Iodides]        Meds    Current Outpatient Medications   Medication Sig Dispense Refill    tiZANidine (ZANAFLEX) 4 MG tablet Take 1 tablet by mouth 3 times daily 90 tablet 1    predniSONE (DELTASONE) 5 MG tablet Sig 10 tablets the first day then decrease 1 daily until finished.  55 tablet 0    amLODIPine (NORVASC) 5 MG tablet Take 1 tablet by mouth daily 30 tablet 11    XARELTO 15 MG TABS tablet TAKE (1) TABLET DAILY 90 tablet 3    Prenatal Multivit-Min-Fe-FA (PRENATAL VITAMINS PO) Take by mouth      atorvastatin (LIPITOR) 10 MG tablet Take 1 tablet by mouth daily 90 tablet 3    losartan-hydrochlorothiazide (HYZAAR) 100-12.5 MG per tablet Take 1 tablet by mouth daily 90 tablet 3    omeprazole (PRILOSEC) 20 MG delayed release capsule Take 20 mg by mouth daily Dr Harmeet Dominguez rx      rOPINIRole (REQUIP) 1 MG tablet Take 1 mg by mouth nightly       No current facility-administered which is about 4 to 5 cm diameter with no fluctuance or disability. Sensation:  Intact. Strength:  Intact. Reflexes: Diminished on the left. Pulses:  Intact. Knee Exam:    Effusion: Negative    Range of Motion Right Left   Extension 0 0   Flexion 110 110     Provocative Test Right Left    Positive Negative Positive Negative   Anterior drawer [] [x] [] [x]   Lachman [] [x] [] [x]   Posterior drawer [] [x] [] [x]   Varus testing [] [x] [] [x]   Valgus testing [] [x] [] [x]   Joint line tenderness [] [x] [] [x]     Additional Exam Comments: This is some diminished sensation left leg with positive straight leg raise on left and positive Lasegue's. She has diminished reflexes on the left versus right. She does have a positive sciatic notch tenderness on the left. IMAGING STUDIES    X-rays 2 views lumbar spine taken today revealed diffuse lumbar spondylitic change  X-rays 2 views of her left hip taken today are normal, revealing no evidence of any acute or chronic hip pathology    IMPRESSION    Left thigh contusion with secondary sciatica which is severe    PLAN      1. Conservative care options including physical therapy, NSAIDs, bracing, and activity modification were discussed. 2.  The indications for therapeutic injections were discussed. 3.  The indications for additional imaging studies were discussed. 4.  After considering the various options discussed, the patient elected to pursue a course that includes proceeding with an MRI of the lumbar spine since her symptoms appear to be sciatic in origin and are worsening despite taking high-dose steroids.

## 2020-02-07 ENCOUNTER — HOSPITAL ENCOUNTER (OUTPATIENT)
Dept: MRI IMAGING | Age: 75
Discharge: HOME | End: 2020-02-07
Payer: MEDICARE

## 2020-02-07 PROCEDURE — 72148 MRI LUMBAR SPINE W/O DYE: CPT

## 2020-02-11 ENCOUNTER — OFFICE VISIT (OUTPATIENT)
Dept: ORTHOPEDIC SURGERY | Age: 75
End: 2020-02-11
Payer: MEDICARE

## 2020-02-11 VITALS — BODY MASS INDEX: 23.65 KG/M2 | WEIGHT: 141.98 LBS | HEIGHT: 65 IN

## 2020-02-11 PROCEDURE — G8399 PT W/DXA RESULTS DOCUMENT: HCPCS | Performed by: PHYSICIAN ASSISTANT

## 2020-02-11 PROCEDURE — 1036F TOBACCO NON-USER: CPT | Performed by: PHYSICIAN ASSISTANT

## 2020-02-11 PROCEDURE — G8484 FLU IMMUNIZE NO ADMIN: HCPCS | Performed by: PHYSICIAN ASSISTANT

## 2020-02-11 PROCEDURE — 99213 OFFICE O/P EST LOW 20 MIN: CPT | Performed by: PHYSICIAN ASSISTANT

## 2020-02-11 PROCEDURE — 3017F COLORECTAL CA SCREEN DOC REV: CPT | Performed by: PHYSICIAN ASSISTANT

## 2020-02-11 PROCEDURE — G8427 DOCREV CUR MEDS BY ELIG CLIN: HCPCS | Performed by: PHYSICIAN ASSISTANT

## 2020-02-11 PROCEDURE — G8420 CALC BMI NORM PARAMETERS: HCPCS | Performed by: PHYSICIAN ASSISTANT

## 2020-02-11 PROCEDURE — 1090F PRES/ABSN URINE INCON ASSESS: CPT | Performed by: PHYSICIAN ASSISTANT

## 2020-02-11 PROCEDURE — 4040F PNEUMOC VAC/ADMIN/RCVD: CPT | Performed by: PHYSICIAN ASSISTANT

## 2020-02-11 PROCEDURE — 1123F ACP DISCUSS/DSCN MKR DOCD: CPT | Performed by: PHYSICIAN ASSISTANT

## 2020-02-11 RX ORDER — GABAPENTIN 300 MG/1
300 CAPSULE ORAL 3 TIMES DAILY
Qty: 90 CAPSULE | Refills: 0 | Status: SHIPPED | OUTPATIENT
Start: 2020-02-11 | End: 2020-03-18

## 2020-03-18 RX ORDER — GABAPENTIN 300 MG/1
CAPSULE ORAL
Qty: 90 CAPSULE | Refills: 0 | Status: SHIPPED | OUTPATIENT
Start: 2020-03-18 | End: 2020-04-15

## 2020-04-02 ENCOUNTER — TELEPHONE (OUTPATIENT)
Dept: ORTHOPEDIC SURGERY | Age: 75
End: 2020-04-02

## 2020-04-03 ENCOUNTER — TELEPHONE (OUTPATIENT)
Dept: ORTHOPEDIC SURGERY | Age: 75
End: 2020-04-03

## 2020-04-15 RX ORDER — GABAPENTIN 300 MG/1
CAPSULE ORAL
Qty: 90 CAPSULE | Refills: 0 | Status: SHIPPED | OUTPATIENT
Start: 2020-04-15 | End: 2020-08-24 | Stop reason: ALTCHOICE

## 2020-05-04 ENCOUNTER — TELEPHONE (OUTPATIENT)
Dept: ORTHOPEDIC SURGERY | Age: 75
End: 2020-05-04

## 2020-05-12 ENCOUNTER — TELEPHONE (OUTPATIENT)
Dept: CARDIOLOGY CLINIC | Age: 75
End: 2020-05-12

## 2020-05-12 ENCOUNTER — OFFICE VISIT (OUTPATIENT)
Dept: ORTHOPEDIC SURGERY | Age: 75
End: 2020-05-12
Payer: MEDICARE

## 2020-05-12 VITALS — BODY MASS INDEX: 23.65 KG/M2 | WEIGHT: 141.98 LBS | HEIGHT: 65 IN

## 2020-05-12 PROCEDURE — 99213 OFFICE O/P EST LOW 20 MIN: CPT | Performed by: ORTHOPAEDIC SURGERY

## 2020-05-12 PROCEDURE — 1123F ACP DISCUSS/DSCN MKR DOCD: CPT | Performed by: ORTHOPAEDIC SURGERY

## 2020-05-12 PROCEDURE — G8420 CALC BMI NORM PARAMETERS: HCPCS | Performed by: ORTHOPAEDIC SURGERY

## 2020-05-12 PROCEDURE — 1090F PRES/ABSN URINE INCON ASSESS: CPT | Performed by: ORTHOPAEDIC SURGERY

## 2020-05-12 PROCEDURE — 1036F TOBACCO NON-USER: CPT | Performed by: ORTHOPAEDIC SURGERY

## 2020-05-12 PROCEDURE — 4040F PNEUMOC VAC/ADMIN/RCVD: CPT | Performed by: ORTHOPAEDIC SURGERY

## 2020-05-12 PROCEDURE — 3017F COLORECTAL CA SCREEN DOC REV: CPT | Performed by: ORTHOPAEDIC SURGERY

## 2020-05-12 PROCEDURE — G8427 DOCREV CUR MEDS BY ELIG CLIN: HCPCS | Performed by: ORTHOPAEDIC SURGERY

## 2020-05-12 PROCEDURE — G8399 PT W/DXA RESULTS DOCUMENT: HCPCS | Performed by: ORTHOPAEDIC SURGERY

## 2020-05-12 NOTE — PROGRESS NOTES
History of present illness:   Ms. Fe Coulter  is a pleasant 69 y.o. female with a PMH of HTN, GERD, and anxiety returning to the office regarding her LBP and left leg pain. She states her pain began insidiously about 4 months ago. Her pain has steadily increased since last office visit. She rates her back pain 9/10 and leg pain 9/10. She describes the pain as burning, sharp and intermittent that is worse with standing, walking, and lying down and better with sitting. The leg pain radiates down the posterolateral aspect of her leg to her knee. She denies numbness and tingling in her leg. She denies weakness of her leg and denies bowel or bladder dysfunction. She has tried oral steroids, muscle relaxant, chiropractic care, PT, and gabapentin without relief. Physical examination:  Ms. Marizol Traore most recent vitals: There is no height or weight on file to calculate BMI. General exam:  She is well-developed and well-nourished, is in obvious pain and alert and oriented to person, place, and time. She demonstrates appropriate mood and affect. Her skin is warm and dry. Her gait is normal and she walks heel to toe without limp or instability. Back:  She stands with slight lumbar flexion. Her lumbar flexion, extension and lateral bending are moderately reduced with pain. She has mild tenderness over her lumbar spine without obvious muscle spasm. The skin over her lumbar spine is normal without a surgical scar. Lower extremities:  She has 5/5 motor strength of bilateral lower extremities. She has a negative straight leg raise, bilaterally. Deep tendon reflexes at knees and achilles are 2+. Sensation is intact to light touch L3 to S1 bilaterally. She has no clonus. Hip range of motion painless. Imaging:  I reviewed MRI images of her lumbar spine from 2/7/20. They note left paracentral disc protrusion L5-S1.     Assessment:  Lumbar HNP left L5-S1 with radiculopathy    Plan:  We discussed

## 2020-05-13 ENCOUNTER — OFFICE VISIT (OUTPATIENT)
Dept: CARDIOLOGY CLINIC | Age: 75
End: 2020-05-13
Payer: MEDICARE

## 2020-05-13 VITALS
HEART RATE: 79 BPM | OXYGEN SATURATION: 97 % | HEIGHT: 65 IN | BODY MASS INDEX: 25.12 KG/M2 | TEMPERATURE: 98.1 F | SYSTOLIC BLOOD PRESSURE: 158 MMHG | DIASTOLIC BLOOD PRESSURE: 78 MMHG | WEIGHT: 150.8 LBS

## 2020-05-13 PROCEDURE — 3017F COLORECTAL CA SCREEN DOC REV: CPT | Performed by: INTERNAL MEDICINE

## 2020-05-13 PROCEDURE — G8427 DOCREV CUR MEDS BY ELIG CLIN: HCPCS | Performed by: INTERNAL MEDICINE

## 2020-05-13 PROCEDURE — 4040F PNEUMOC VAC/ADMIN/RCVD: CPT | Performed by: INTERNAL MEDICINE

## 2020-05-13 PROCEDURE — 99214 OFFICE O/P EST MOD 30 MIN: CPT | Performed by: INTERNAL MEDICINE

## 2020-05-13 PROCEDURE — 1036F TOBACCO NON-USER: CPT | Performed by: INTERNAL MEDICINE

## 2020-05-13 PROCEDURE — 1090F PRES/ABSN URINE INCON ASSESS: CPT | Performed by: INTERNAL MEDICINE

## 2020-05-13 PROCEDURE — G8417 CALC BMI ABV UP PARAM F/U: HCPCS | Performed by: INTERNAL MEDICINE

## 2020-05-13 PROCEDURE — G8399 PT W/DXA RESULTS DOCUMENT: HCPCS | Performed by: INTERNAL MEDICINE

## 2020-05-13 PROCEDURE — 93000 ELECTROCARDIOGRAM COMPLETE: CPT | Performed by: INTERNAL MEDICINE

## 2020-05-13 PROCEDURE — 1123F ACP DISCUSS/DSCN MKR DOCD: CPT | Performed by: INTERNAL MEDICINE

## 2020-05-13 RX ORDER — ATORVASTATIN CALCIUM 10 MG/1
10 TABLET, FILM COATED ORAL DAILY
Qty: 90 TABLET | Refills: 3 | Status: CANCELLED | OUTPATIENT
Start: 2020-05-13

## 2020-05-13 RX ORDER — DULOXETIN HYDROCHLORIDE 30 MG/1
30 CAPSULE, DELAYED RELEASE ORAL DAILY
COMMUNITY
Start: 2020-05-06 | End: 2020-08-24 | Stop reason: ALTCHOICE

## 2020-05-13 RX ORDER — LOSARTAN POTASSIUM AND HYDROCHLOROTHIAZIDE 12.5; 1 MG/1; MG/1
1 TABLET ORAL DAILY
Qty: 90 TABLET | Refills: 3 | Status: SHIPPED | OUTPATIENT
Start: 2020-05-13 | End: 2021-05-19

## 2020-05-13 RX ORDER — IBUPROFEN 600 MG/1
400 TABLET ORAL
COMMUNITY

## 2020-05-13 NOTE — PROGRESS NOTES
GASTROINTESTINAL ENDOSCOPY  12/2/2014    VASCULAR SURGERY      r leg       Objective:   BP (!) 158/78   Pulse 79   Temp 98.1 °F (36.7 °C)   Ht 5' 5\" (1.651 m)   Wt 150 lb 12.8 oz (68.4 kg)   SpO2 97%   BMI 25.09 kg/m²     Wt Readings from Last 3 Encounters:   05/13/20 150 lb 12.8 oz (68.4 kg)   05/12/20 141 lb 15.6 oz (64.4 kg)   02/11/20 141 lb 15.6 oz (64.4 kg)       Physical Exam:  General: No Respiratory distress, appears well developed and well nourished. Eyes:  Sclera nonicteric  Nose/Sinuses:  negative findings: nose shows no deformity, asymmetry, or inflammation, nasal mucosa normal, septum midline with no perforation or bleeding  Back:  no pain to palpation  Joint:  no active joint inflammation  Musculoskeletal:  negative  Skin:  Warm and dry  Neck:  Negative for JVD and Carotid Bruits. Chest:  Clear to auscultation, respiration easy  Cardiovascular:  Irreg irreg, 95/ bpm S1S2 normal, no murmur, no rub or thrill.   Extremities:   No edema, clubbing, cyanosis,superficial varicose veins in legs  Pulses: pedal pulses are normal.  Neuro: intact    Medications:   Outpatient Encounter Medications as of 5/13/2020   Medication Sig Dispense Refill    DULoxetine (CYMBALTA) 30 MG extended release capsule       ibuprofen (ADVIL;MOTRIN) 600 MG tablet Take 400 mg by mouth every 4-6 hours as needed for Pain      losartan-hydrochlorothiazide (HYZAAR) 100-12.5 MG per tablet Take 1 tablet by mouth daily 90 tablet 3    gabapentin (NEURONTIN) 300 MG capsule TAKE ONE CAPSULE BY MOUTH THREE TIMES A DAY 90 capsule 0    amLODIPine (NORVASC) 5 MG tablet Take 1 tablet by mouth daily 30 tablet 11    Prenatal Multivit-Min-Fe-FA (PRENATAL VITAMINS PO) Take by mouth      omeprazole (PRILOSEC) 20 MG delayed release capsule Take 20 mg by mouth daily Dr Pranay Power rx      rOPINIRole (REQUIP) 1 MG tablet Take 1 mg by mouth nightly      [DISCONTINUED] tiZANidine (ZANAFLEX) 4 MG tablet Take 1 tablet by mouth 3 times daily 90 calcification.   Mild mitral regurgitation.   Aortic valve appears tricuspid and sclerotic but opens adequately.   Moderate-severe tricuspid regurgitation.   Systolic pulmonary artery pressure (SPAP) is normal and estimated at 32 mmHg   (RA pressure 8 mmHg). EKG 5/3/17 AFIB with controlled VR 75 bpm    Assessment:  Encounter Diagnosis   Name Primary?  Longstanding persistent atrial fibrillation Yes       May was seen today for cardiology follow up hx of AFIB, HTN, HLD. Diagnoses and all orders for this visit:  Fatigue resolved after stopping metoprolol  Mixed hyperlipidemia  Most recent lipids on file 6/29/18 reviewed in epic  Essential hypertension white coat syndrome    Palpitations resolved    Patent foramen ovale s/p patch closure    Chronic atrial fibrillation (HCC)      CHADSVAS score 3    Plan:  1. Meds reviewed. Refills as warranted   2. Ok to proceed with scheduled back surgery with Dr. Geeta Solorzano. Stable cardiac status. No further testing warranted. 3. Recommend resuming Lipitor   4. Follow up with me in 6 months   5 We can discuss further after back surgery and resuming anticoagulation. Risks of thromboembolism discussed with her. This note was scribed in the presence of Melia Chen MD by Rohan Mcguire RN      QUALITY MEASURES  1. Tobacco Cessation Counseling: NA  2. Retake of BP if >140/90:   Yes  3. Documentation to PCP/referring for new patient:  Sent to PCP at close of office visit  4. CAD patient on anti-platelet: no  5. CAD patient on STATIN therapy:  She will restart it  6. Patient with CHF and aFib on anticoagulation: yes on hold     I, Dr. Shiraz Gonzalez, personally performed the services described in this documentation, as scribed by the above signed scribe in my presence. It is both accurate and complete to my knowledge.  I agree with the details independently gathered by the clinical support staff, while the remaining scribed note accurately describes my personal service to

## 2020-05-13 NOTE — LETTER
[id number]  [facility] [id number]  Phone: [phone]  Fax: [phone]     Lubna Pinto MD     5/18/2020     Chadwick Ariza MD   [id number]     Patient: Willi Campos   MR Number: <[id number]>   YOB: 1945   Date of Visit: 5/13/2020     Dear Dr. Chadwick Ariza     Today I saw our mutual patient named above. Below are the relevant portions of my assessment and plan of care. If you have questions, please do not hesitate to call me. I look forward to following May along with you. [address] Office Note  5/13/2020      Subjective:  Ms. Cristy Daniel is being seen for  HTN, HLD, persistent AFIB      [place]:  Today she reports ongoing back pain. She follows with orthopedic surgery Dr. Carter Tee. has upcoming lumbar discectomy in 2 weeks. Reports she stopped taking Xarelto due to frequently taking ibuprofen and increased bleeding risk. Takes up to 3200 mg daily to be able to function. She has sciatica on left side. She also stopped taking Lipitor to reduce the amount of medications she's on. She reports ocassional chest pain after eating denies chest pain with exertion. Denies, shortness of breath, edema, dizziness, palpitations and syncope. PMH   HTN, HLD, AFIB with controlled rates, DVT year ago, varicose veins, GERD and anxiety. She reports for years having irregular heart beat. She reports s/p PFO 30 years ago open surgery with pericardial patch by Dr Irish Turpin. She reports stress test 2013 in [facility] was reportedly normal.  she reports on 6/23/19 she woke up to use bathroom. She was bearing down to have BM. She was lightheaded went to the kitchen for a cookie. She passed out and slid down the cabinets. She reports a couple brief episodes  of CP lasting seconds.      Review of Systems:  12 point ROS negative in all areas as listed below except as in [place]  Constitutional, EENT, Cardiovascular, pulmonary, GI, , Musculoskeletal,  losartan-hydrochlorothiazide (HYZAAR) 100-12.5 MG per tablet Take 1 tablet by mouth daily 90 tablet 3    gabapentin (NEURONTIN) 300 MG capsule TAKE ONE CAPSULE BY MOUTH THREE TIMES A DAY 90 capsule 0    amLODIPine (NORVASC) 5 MG tablet Take 1 tablet by mouth daily 30 tablet 11    Prenatal Multivit-Min-Fe-FA (PRENATAL VITAMINS PO) Take by mouth      omeprazole (PRILOSEC) 20 MG delayed release capsule Take 20 mg by mouth daily Dr Randolm Nissen rx      rOPINIRole (REQUIP) 1 MG tablet Take 1 mg by mouth nightly      [DISCONTINUED] tiZANidine (ZANAFLEX) 4 MG tablet Take 1 tablet by mouth 3 times daily 90 tablet 1    [DISCONTINUED] predniSONE (DELTASONE) 5 MG tablet Sig 10 tablets the first day then decrease 1 daily until finished. (Patient not taking: Reported on 5/13/2020) 55 tablet 0    [DISCONTINUED] XARELTO 15 MG TABS tablet TAKE (1) TABLET DAILY (Patient not taking: Reported on 5/13/2020) 90 tablet 3    atorvastatin (LIPITOR) 10 MG tablet Take 1 tablet by mouth daily (Patient not taking: Reported on 5/13/2020) 90 tablet 3    [DISCONTINUED] losartan-hydrochlorothiazide (HYZAAR) 100-12.5 MG per tablet Take 1 tablet by mouth daily 90 tablet 3     No facility-administered encounter medications on file as of 5/13/2020. Lab Data:  CBC: No results for input(s): WBC, HGB, HCT, MCV, PLT in the last 72 hours. BMP: No results for input(s): NA, K, CL, CO2, PHOS, BUN, CREATININE in the last 72 hours. Invalid input(s): CA  LIVER PROFILE: No results for input(s): AST, ALT, LIPASE, BILIDIR, BILITOT, ALKPHOS in the last 72 hours. Invalid input(s):   AMYLASE,  ALB  LIPID:   Lab Results   Component Value Date    CHOL 214 06/29/2018     Lab Results   Component Value Date    TRIG 151 06/29/2018     Lab Results   Component Value Date    HDL 52 06/29/2018     Lab Results   Component Value Date    LDLCALC 132 06/29/2018     Lab Results   Component Value Date    VLDL 30 06/29/2018     No results found for: CHOLHDLRATIO PT/INR:   No results for input(s): PROTIME, INR in the last 72 hours. A1C: No results found for: LABA1C  BNP:  No results for input(s): BNP in the last 72 hours. IMAGING:   EKG 5/13/20   Afib, LBBB afib rate controlled     EKG 6/26/19  afib rate control. T wave inversion consider anterior  ischemia    ECHO 5/15/17  Summary   Normal LV systolic function with an estimated LVEF of 55%.   Sigmoid-shaped basal septum.   Mild concentric LVH.   Elevated left ventricular diastolic filling pressure.   The left atrium is mildly dilated.   The right atrium is moderately dilated.   Mild mitral annular calcification.   Mild mitral regurgitation.   Aortic valve appears tricuspid and sclerotic but opens adequately.   Moderate-severe tricuspid regurgitation.   Systolic pulmonary artery pressure (SPAP) is normal and estimated at 32 mmHg   (RA pressure 8 mmHg). EKG 5/3/17 AFIB with controlled VR 75 bpm    Assessment:  Encounter Diagnosis   Name Primary?  Longstanding persistent atrial fibrillation Yes       May was seen today for cardiology follow up hx of AFIB, HTN, HLD. Diagnoses and all orders for this visit:  Fatigue resolved after stopping metoprolol  Mixed hyperlipidemia  Most recent lipids on file 6/29/18 reviewed in epic  Essential hypertension white coat syndrome    Palpitations resolved    Patent foramen ovale s/p patch closure    Chronic atrial fibrillation (HCC)      CHADSVAS score 3    Plan:  1. Meds reviewed. Refills as warranted   2. Ok to proceed with scheduled back surgery with Dr. Leslie Heaton. Stable cardiac status. No further testing warranted. 3. Recommend resuming Lipitor   4. Follow up with me in 6 months   5 We can discuss further after back surgery and resuming anticoagulation. Risks of thromboembolism discussed with her. This note was scribed in the presence of Tim Keys MD by Ralph Zhang RN      QUALITY MEASURES  1.  Tobacco Cessation Counseling: NA

## 2020-05-20 ENCOUNTER — TELEPHONE (OUTPATIENT)
Dept: ORTHOPEDIC SURGERY | Age: 75
End: 2020-05-20

## 2020-05-20 ENCOUNTER — OFFICE VISIT (OUTPATIENT)
Dept: ORTHOPEDIC SURGERY | Age: 75
End: 2020-05-20
Payer: MEDICARE

## 2020-05-20 VITALS — WEIGHT: 150 LBS | HEIGHT: 65 IN | BODY MASS INDEX: 24.99 KG/M2

## 2020-05-20 PROCEDURE — 20610 DRAIN/INJ JOINT/BURSA W/O US: CPT | Performed by: ORTHOPAEDIC SURGERY

## 2020-05-20 RX ORDER — BUPIVACAINE HYDROCHLORIDE 2.5 MG/ML
7 INJECTION, SOLUTION INFILTRATION; PERINEURAL ONCE
Status: COMPLETED | OUTPATIENT
Start: 2020-05-20 | End: 2020-05-20

## 2020-05-20 RX ORDER — METHYLPREDNISOLONE ACETATE 40 MG/ML
40 INJECTION, SUSPENSION INTRA-ARTICULAR; INTRALESIONAL; INTRAMUSCULAR; SOFT TISSUE ONCE
Status: COMPLETED | OUTPATIENT
Start: 2020-05-20 | End: 2020-05-20

## 2020-05-20 RX ADMIN — BUPIVACAINE HYDROCHLORIDE 17.5 MG: 2.5 INJECTION, SOLUTION INFILTRATION; PERINEURAL at 10:12

## 2020-05-20 RX ADMIN — METHYLPREDNISOLONE ACETATE 40 MG: 40 INJECTION, SUSPENSION INTRA-ARTICULAR; INTRALESIONAL; INTRAMUSCULAR; SOFT TISSUE at 10:12

## 2020-05-20 NOTE — PROGRESS NOTES
Presents today requesting a cortisone injection to the left knee for diagnosis of osteoarthritis. We will do that today but when she returns she should obtain an x-ray of her left knee. Recommendation is for a cortisone injection into the left knee. After informed consent was received from the patient, the left knee was injected with 1 mL( 40mg) Depo-Medrol and 4 mL  of 0.25% Marcaine in the syringe from an anterolateral joint line approach, using a 25-gauge needle, under sterile Betadine prep, using ethyl chloride as a topical refrigerant, for a diagnosis of osteoarthritis. The patient appeared to tolerate it well. The patient should return here periodically as needed. Encounter Diagnosis   Name Primary?     Chronic pain of left knee Yes        Orders Placed This Encounter   Procedures    CT ARTHROCENTESIS ASPIR&/INJ MAJOR JT/BURSA W/O US

## 2020-05-20 NOTE — TELEPHONE ENCOUNTER
Spoke with patient and let her know that surgery will be May 29. Gave all information. Also, called her daughter Sanam Roman and went over everything. Patient requested that her daughter be informed. Patient has appt with PCP on Friday and will get physical and COVID test.   Also, she has been cleared by Cardiologist.  They will fax over clearance.

## 2020-05-21 ENCOUNTER — TELEPHONE (OUTPATIENT)
Dept: ORTHOPEDIC SURGERY | Age: 75
End: 2020-05-21

## 2020-05-26 ENCOUNTER — ANESTHESIA EVENT (OUTPATIENT)
Dept: OPERATING ROOM | Age: 75
End: 2020-05-26
Payer: MEDICARE

## 2020-05-28 ENCOUNTER — TELEPHONE (OUTPATIENT)
Dept: ORTHOPEDIC SURGERY | Age: 75
End: 2020-05-28

## 2020-05-29 ENCOUNTER — HOSPITAL ENCOUNTER (OUTPATIENT)
Dept: GENERAL RADIOLOGY | Age: 75
Discharge: HOME | End: 2020-05-29
Payer: MEDICARE

## 2020-05-29 ENCOUNTER — HOSPITAL ENCOUNTER (OUTPATIENT)
Age: 75
Setting detail: OUTPATIENT SURGERY
Discharge: HOME | End: 2020-05-29
Attending: ORTHOPAEDIC SURGERY | Admitting: ORTHOPAEDIC SURGERY
Payer: MEDICARE

## 2020-05-29 ENCOUNTER — ANESTHESIA (OUTPATIENT)
Dept: OPERATING ROOM | Age: 75
End: 2020-05-29
Payer: MEDICARE

## 2020-05-29 VITALS
SYSTOLIC BLOOD PRESSURE: 155 MMHG | HEART RATE: 85 BPM | HEIGHT: 65 IN | DIASTOLIC BLOOD PRESSURE: 61 MMHG | OXYGEN SATURATION: 95 % | RESPIRATION RATE: 20 BRPM | WEIGHT: 150 LBS | BODY MASS INDEX: 24.99 KG/M2 | TEMPERATURE: 97.1 F

## 2020-05-29 VITALS
OXYGEN SATURATION: 95 % | DIASTOLIC BLOOD PRESSURE: 68 MMHG | RESPIRATION RATE: 12 BRPM | TEMPERATURE: 98.4 F | SYSTOLIC BLOOD PRESSURE: 124 MMHG

## 2020-05-29 LAB
ANION GAP SERPL CALCULATED.3IONS-SCNC: 10 MMOL/L (ref 3–16)
BUN BLDV-MCNC: 15 MG/DL (ref 7–20)
CALCIUM SERPL-MCNC: 9.3 MG/DL (ref 8.3–10.6)
CHLORIDE BLD-SCNC: 105 MMOL/L (ref 99–110)
CO2: 25 MMOL/L (ref 21–32)
CREAT SERPL-MCNC: 0.7 MG/DL (ref 0.6–1.2)
GFR AFRICAN AMERICAN: >60
GFR NON-AFRICAN AMERICAN: >60
GLUCOSE BLD-MCNC: 124 MG/DL (ref 70–99)
HCT VFR BLD CALC: 42.9 % (ref 36–48)
HEMOGLOBIN: 14.5 G/DL (ref 12–16)
MCH RBC QN AUTO: 31.1 PG (ref 26–34)
MCHC RBC AUTO-ENTMCNC: 33.8 G/DL (ref 31–36)
MCV RBC AUTO: 91.9 FL (ref 80–100)
PDW BLD-RTO: 13.1 % (ref 12.4–15.4)
PLATELET # BLD: 221 K/UL (ref 135–450)
PMV BLD AUTO: 9.2 FL (ref 5–10.5)
POTASSIUM REFLEX MAGNESIUM: 3.7 MMOL/L (ref 3.5–5.1)
RBC # BLD: 4.67 M/UL (ref 4–5.2)
SODIUM BLD-SCNC: 140 MMOL/L (ref 136–145)
WBC # BLD: 7.7 K/UL (ref 4–11)

## 2020-05-29 PROCEDURE — 72100 X-RAY EXAM L-S SPINE 2/3 VWS: CPT

## 2020-05-29 PROCEDURE — 3700000001 HC ADD 15 MINUTES (ANESTHESIA): Performed by: ORTHOPAEDIC SURGERY

## 2020-05-29 PROCEDURE — 80048 BASIC METABOLIC PNL TOTAL CA: CPT

## 2020-05-29 PROCEDURE — 3209999900 FLUORO FOR SURGICAL PROCEDURES

## 2020-05-29 PROCEDURE — 7100000000 HC PACU RECOVERY - FIRST 15 MIN: Performed by: ORTHOPAEDIC SURGERY

## 2020-05-29 PROCEDURE — 7100000001 HC PACU RECOVERY - ADDTL 15 MIN: Performed by: ORTHOPAEDIC SURGERY

## 2020-05-29 PROCEDURE — 7100000011 HC PHASE II RECOVERY - ADDTL 15 MIN: Performed by: ORTHOPAEDIC SURGERY

## 2020-05-29 PROCEDURE — 3600000005 HC SURGERY LEVEL 5 BASE: Performed by: ORTHOPAEDIC SURGERY

## 2020-05-29 PROCEDURE — 6360000002 HC RX W HCPCS: Performed by: ORTHOPAEDIC SURGERY

## 2020-05-29 PROCEDURE — 6360000002 HC RX W HCPCS: Performed by: ANESTHESIOLOGY

## 2020-05-29 PROCEDURE — 2500000003 HC RX 250 WO HCPCS: Performed by: ORTHOPAEDIC SURGERY

## 2020-05-29 PROCEDURE — 2720000010 HC SURG SUPPLY STERILE: Performed by: ORTHOPAEDIC SURGERY

## 2020-05-29 PROCEDURE — 3700000000 HC ANESTHESIA ATTENDED CARE: Performed by: ORTHOPAEDIC SURGERY

## 2020-05-29 PROCEDURE — 2500000003 HC RX 250 WO HCPCS: Performed by: NURSE ANESTHETIST, CERTIFIED REGISTERED

## 2020-05-29 PROCEDURE — 2709999900 HC NON-CHARGEABLE SUPPLY: Performed by: ORTHOPAEDIC SURGERY

## 2020-05-29 PROCEDURE — 6360000002 HC RX W HCPCS: Performed by: NURSE ANESTHETIST, CERTIFIED REGISTERED

## 2020-05-29 PROCEDURE — 2580000003 HC RX 258: Performed by: ORTHOPAEDIC SURGERY

## 2020-05-29 PROCEDURE — 2580000003 HC RX 258: Performed by: ANESTHESIOLOGY

## 2020-05-29 PROCEDURE — 7100000010 HC PHASE II RECOVERY - FIRST 15 MIN: Performed by: ORTHOPAEDIC SURGERY

## 2020-05-29 PROCEDURE — 2500000003 HC RX 250 WO HCPCS: Performed by: ANESTHESIOLOGY

## 2020-05-29 PROCEDURE — 85027 COMPLETE CBC AUTOMATED: CPT

## 2020-05-29 PROCEDURE — 3600000015 HC SURGERY LEVEL 5 ADDTL 15MIN: Performed by: ORTHOPAEDIC SURGERY

## 2020-05-29 RX ORDER — DOCUSATE SODIUM 100 MG/1
100 CAPSULE, LIQUID FILLED ORAL 2 TIMES DAILY PRN
Qty: 30 CAPSULE | Refills: 0 | Status: SHIPPED | OUTPATIENT
Start: 2020-05-29 | End: 2020-06-13

## 2020-05-29 RX ORDER — BUPIVACAINE HYDROCHLORIDE AND EPINEPHRINE 2.5; 5 MG/ML; UG/ML
INJECTION, SOLUTION EPIDURAL; INFILTRATION; INTRACAUDAL; PERINEURAL PRN
Status: DISCONTINUED | OUTPATIENT
Start: 2020-05-29 | End: 2020-05-29 | Stop reason: ALTCHOICE

## 2020-05-29 RX ORDER — ACETAMINOPHEN 10 MG/ML
1000 INJECTION, SOLUTION INTRAVENOUS
Status: DISCONTINUED | OUTPATIENT
Start: 2020-05-29 | End: 2020-05-29

## 2020-05-29 RX ORDER — FENTANYL CITRATE 50 UG/ML
INJECTION, SOLUTION INTRAMUSCULAR; INTRAVENOUS PRN
Status: DISCONTINUED | OUTPATIENT
Start: 2020-05-29 | End: 2020-05-29 | Stop reason: SDUPTHER

## 2020-05-29 RX ORDER — OXYCODONE HYDROCHLORIDE AND ACETAMINOPHEN 5; 325 MG/1; MG/1
2 TABLET ORAL PRN
Status: DISCONTINUED | OUTPATIENT
Start: 2020-05-29 | End: 2020-05-29 | Stop reason: HOSPADM

## 2020-05-29 RX ORDER — LABETALOL HYDROCHLORIDE 5 MG/ML
5 INJECTION, SOLUTION INTRAVENOUS EVERY 10 MIN PRN
Status: DISCONTINUED | OUTPATIENT
Start: 2020-05-29 | End: 2020-05-29 | Stop reason: HOSPADM

## 2020-05-29 RX ORDER — SODIUM CHLORIDE 0.9 % (FLUSH) 0.9 %
10 SYRINGE (ML) INJECTION PRN
Status: DISCONTINUED | OUTPATIENT
Start: 2020-05-29 | End: 2020-05-29 | Stop reason: HOSPADM

## 2020-05-29 RX ORDER — ONDANSETRON 2 MG/ML
INJECTION INTRAMUSCULAR; INTRAVENOUS PRN
Status: DISCONTINUED | OUTPATIENT
Start: 2020-05-29 | End: 2020-05-29 | Stop reason: SDUPTHER

## 2020-05-29 RX ORDER — LIDOCAINE HYDROCHLORIDE 20 MG/ML
INJECTION, SOLUTION INFILTRATION; PERINEURAL PRN
Status: DISCONTINUED | OUTPATIENT
Start: 2020-05-29 | End: 2020-05-29 | Stop reason: SDUPTHER

## 2020-05-29 RX ORDER — SODIUM CHLORIDE, SODIUM LACTATE, POTASSIUM CHLORIDE, CALCIUM CHLORIDE 600; 310; 30; 20 MG/100ML; MG/100ML; MG/100ML; MG/100ML
INJECTION, SOLUTION INTRAVENOUS CONTINUOUS
Status: DISCONTINUED | OUTPATIENT
Start: 2020-05-29 | End: 2020-05-29 | Stop reason: HOSPADM

## 2020-05-29 RX ORDER — DEXAMETHASONE SODIUM PHOSPHATE 4 MG/ML
INJECTION, SOLUTION INTRA-ARTICULAR; INTRALESIONAL; INTRAMUSCULAR; INTRAVENOUS; SOFT TISSUE PRN
Status: DISCONTINUED | OUTPATIENT
Start: 2020-05-29 | End: 2020-05-29 | Stop reason: SDUPTHER

## 2020-05-29 RX ORDER — ONDANSETRON 2 MG/ML
4 INJECTION INTRAMUSCULAR; INTRAVENOUS EVERY 10 MIN PRN
Status: DISCONTINUED | OUTPATIENT
Start: 2020-05-29 | End: 2020-05-29 | Stop reason: HOSPADM

## 2020-05-29 RX ORDER — SODIUM CHLORIDE 0.9 % (FLUSH) 0.9 %
10 SYRINGE (ML) INJECTION EVERY 12 HOURS SCHEDULED
Status: DISCONTINUED | OUTPATIENT
Start: 2020-05-29 | End: 2020-05-29 | Stop reason: HOSPADM

## 2020-05-29 RX ORDER — EPHEDRINE SULFATE 50 MG/ML
INJECTION INTRAVENOUS PRN
Status: DISCONTINUED | OUTPATIENT
Start: 2020-05-29 | End: 2020-05-29 | Stop reason: SDUPTHER

## 2020-05-29 RX ORDER — ROCURONIUM BROMIDE 10 MG/ML
INJECTION, SOLUTION INTRAVENOUS PRN
Status: DISCONTINUED | OUTPATIENT
Start: 2020-05-29 | End: 2020-05-29 | Stop reason: SDUPTHER

## 2020-05-29 RX ORDER — HYDRALAZINE HYDROCHLORIDE 20 MG/ML
5 INJECTION INTRAMUSCULAR; INTRAVENOUS EVERY 10 MIN PRN
Status: DISCONTINUED | OUTPATIENT
Start: 2020-05-29 | End: 2020-05-29 | Stop reason: HOSPADM

## 2020-05-29 RX ORDER — PROPOFOL 10 MG/ML
INJECTION, EMULSION INTRAVENOUS PRN
Status: DISCONTINUED | OUTPATIENT
Start: 2020-05-29 | End: 2020-05-29 | Stop reason: SDUPTHER

## 2020-05-29 RX ORDER — OXYCODONE HYDROCHLORIDE AND ACETAMINOPHEN 5; 325 MG/1; MG/1
1 TABLET ORAL PRN
Status: DISCONTINUED | OUTPATIENT
Start: 2020-05-29 | End: 2020-05-29 | Stop reason: HOSPADM

## 2020-05-29 RX ORDER — OXYCODONE HYDROCHLORIDE AND ACETAMINOPHEN 5; 325 MG/1; MG/1
1-2 TABLET ORAL
Qty: 40 TABLET | Refills: 0 | Status: SHIPPED | OUTPATIENT
Start: 2020-05-29 | End: 2020-06-05

## 2020-05-29 RX ORDER — ACETAMINOPHEN 10 MG/ML
1000 INJECTION, SOLUTION INTRAVENOUS
Status: COMPLETED | OUTPATIENT
Start: 2020-05-29 | End: 2020-05-29

## 2020-05-29 RX ADMIN — EPHEDRINE SULFATE 10 MG: 50 INJECTION INTRAVENOUS at 12:51

## 2020-05-29 RX ADMIN — FENTANYL CITRATE 100 MCG: 50 INJECTION INTRAMUSCULAR; INTRAVENOUS at 12:34

## 2020-05-29 RX ADMIN — DEXAMETHASONE SODIUM PHOSPHATE 8 MG: 4 INJECTION, SOLUTION INTRAMUSCULAR; INTRAVENOUS at 12:43

## 2020-05-29 RX ADMIN — ROCURONIUM BROMIDE 50 MG: 10 SOLUTION INTRAVENOUS at 12:34

## 2020-05-29 RX ADMIN — PROPOFOL 200 MG: 10 INJECTION, EMULSION INTRAVENOUS at 12:34

## 2020-05-29 RX ADMIN — ACETAMINOPHEN 1000 MG: 10 INJECTION, SOLUTION INTRAVENOUS at 13:37

## 2020-05-29 RX ADMIN — EPHEDRINE SULFATE 10 MG: 50 INJECTION INTRAVENOUS at 12:43

## 2020-05-29 RX ADMIN — PHENYLEPHRINE HYDROCHLORIDE 100 MCG: 10 INJECTION INTRAVENOUS at 12:50

## 2020-05-29 RX ADMIN — PHENYLEPHRINE HYDROCHLORIDE 100 MCG: 10 INJECTION INTRAVENOUS at 12:52

## 2020-05-29 RX ADMIN — LIDOCAINE HYDROCHLORIDE 80 MG: 20 INJECTION, SOLUTION INFILTRATION; PERINEURAL at 12:34

## 2020-05-29 RX ADMIN — SUGAMMADEX 200 MG: 100 INJECTION, SOLUTION INTRAVENOUS at 13:21

## 2020-05-29 RX ADMIN — SODIUM CHLORIDE, POTASSIUM CHLORIDE, SODIUM LACTATE AND CALCIUM CHLORIDE: 600; 310; 30; 20 INJECTION, SOLUTION INTRAVENOUS at 13:21

## 2020-05-29 RX ADMIN — EPHEDRINE SULFATE 10 MG: 50 INJECTION INTRAVENOUS at 12:39

## 2020-05-29 RX ADMIN — FAMOTIDINE 20 MG: 10 INJECTION INTRAVENOUS at 11:38

## 2020-05-29 RX ADMIN — ONDANSETRON 4 MG: 2 INJECTION INTRAMUSCULAR; INTRAVENOUS at 12:43

## 2020-05-29 RX ADMIN — EPHEDRINE SULFATE 10 MG: 50 INJECTION INTRAVENOUS at 12:47

## 2020-05-29 RX ADMIN — PHENYLEPHRINE HYDROCHLORIDE 100 MCG: 10 INJECTION INTRAVENOUS at 12:48

## 2020-05-29 RX ADMIN — CEFAZOLIN SODIUM 2 G: 10 INJECTION, POWDER, FOR SOLUTION INTRAVENOUS at 12:33

## 2020-05-29 RX ADMIN — EPHEDRINE SULFATE 10 MG: 50 INJECTION INTRAVENOUS at 12:36

## 2020-05-29 RX ADMIN — SODIUM CHLORIDE, POTASSIUM CHLORIDE, SODIUM LACTATE AND CALCIUM CHLORIDE: 600; 310; 30; 20 INJECTION, SOLUTION INTRAVENOUS at 12:27

## 2020-05-29 ASSESSMENT — PAIN - FUNCTIONAL ASSESSMENT
PAIN_FUNCTIONAL_ASSESSMENT: ACTIVITIES ARE NOT PREVENTED
PAIN_FUNCTIONAL_ASSESSMENT: 0-10

## 2020-05-29 ASSESSMENT — PAIN DESCRIPTION - DESCRIPTORS: DESCRIPTORS: CONSTANT

## 2020-05-29 ASSESSMENT — PULMONARY FUNCTION TESTS
PIF_VALUE: 23
PIF_VALUE: 3
PIF_VALUE: 21
PIF_VALUE: 18
PIF_VALUE: 23
PIF_VALUE: 23
PIF_VALUE: 0
PIF_VALUE: 22
PIF_VALUE: 23
PIF_VALUE: 23
PIF_VALUE: 0
PIF_VALUE: 2
PIF_VALUE: 22
PIF_VALUE: 20
PIF_VALUE: 21
PIF_VALUE: 22
PIF_VALUE: 1
PIF_VALUE: 27
PIF_VALUE: 23
PIF_VALUE: 23
PIF_VALUE: 24
PIF_VALUE: 21
PIF_VALUE: 18
PIF_VALUE: 7
PIF_VALUE: 24
PIF_VALUE: 0
PIF_VALUE: 23
PIF_VALUE: 23
PIF_VALUE: 3
PIF_VALUE: 23
PIF_VALUE: 23
PIF_VALUE: 7
PIF_VALUE: 22
PIF_VALUE: 18
PIF_VALUE: 21
PIF_VALUE: 18
PIF_VALUE: 21
PIF_VALUE: 23
PIF_VALUE: 4
PIF_VALUE: 22
PIF_VALUE: 21
PIF_VALUE: 1
PIF_VALUE: 0
PIF_VALUE: 23
PIF_VALUE: 22
PIF_VALUE: 22
PIF_VALUE: 2
PIF_VALUE: 22
PIF_VALUE: 27
PIF_VALUE: 22
PIF_VALUE: 23
PIF_VALUE: 23
PIF_VALUE: 21

## 2020-05-29 NOTE — PROGRESS NOTES
Patient discharged via wheelchair in stable condition with all belongings to private car. Maribel Mcclure

## 2020-05-29 NOTE — PROGRESS NOTES
Patient admitted from OR to PACU. Bedside report received. Patient immediately hooked up to heart monitor. Barb Pyle

## 2020-05-29 NOTE — OP NOTE
Operative Note      Patient: Willi Campos  YOB: 1945  MRN: [id number]    Date of Procedure: 5/29/2020    Pre-Op Diagnosis: LUMBAR HERNIATED DISC WITH RADICULOPATHY    Post-Op Diagnosis: Same       Procedure(s): MICROLUMBAR DISCECTOMY L5-S1    Surgeon(s):  Ciera Segal MD    Assistant:   Surgical Assistant: Himanshu Tafoya  Physician Assistant: LUÍS De La Cruz    Anesthesia: General    Estimated Blood Loss (mL): <46MX    Complications: none    Specimens:   * No specimens in log *    Implants:  * No implants in log *      Drains: none    Findings: HNP    Detailed Description of Procedure:     INDICATIONS FOR SURGERY:   Willi Campos is a 69 y.o. female with back and left leg pain. The symptoms failed to respond to conservative intervention. An MRI scan was performed and this showed evidence of a disk herniation at the L4-5 level on the left. Having failed conservative management and experiencing persistent symptoms, the patient elected to proceed ahead with the surgical option of microdiskectomy at L5-S1. DETAILS OF PROCEDURE:  The patient was brought to the operating room and placed under general anesthesia. The patient was then placed prone on a Ulises table. All bony prominences were inspected and padded prior to sterile draping. Using a #10 blade knife, the skin was incised in the midline and monopolar cautery was used to dissect through the subcutaneous tissue to open the fascia and reflect the paraspinal muscles laterally exposing the L5-S1 interspace on the left side. The microscope was then brought into the field and used to assist with performing a microsurgical diskectomy at this level. Using the Midas Bryson drill, I burred down the hemilamina of  L5. The underlying ligamentum flavum was freed with the micronerve hook from the underlying dura and removed with the 3 mm punch laterally, exposing the lateral dural tube and takeoff of the S1 nerve root.   The S1 nerve root was

## 2020-05-29 NOTE — ANESTHESIA PRE PROCEDURE
Renee Alberts MD        sodium chloride flush 0.9 % injection 10 mL  10 mL Intravenous PRN Renee Alberts MD        acetaminophen (OFIRMEV) infusion 1,000 mg  1,000 mg Intravenous On Call to Johnny Austin MD           Allergies: Allergies   Allergen Reactions    Iv Dye [Iodides]      GI symptoms       Problem List:    Patient Active Problem List   Diagnosis Code    Hypertension I10    Mixed hyperlipidemia E78.2    Palpitations R00.2    Patent foramen ovale Q21.1    Chronic atrial fibrillation I48.20    Syncope R55    Other chest pain R07.89    Chronic pain of left knee M25.562, G89.29    Pain of left lower extremity M79.605    DDD (degenerative disc disease), lumbar M51.36    Left sided sciatica M54.32       Past Medical History:        Diagnosis Date    Anxiety     GERD (gastroesophageal reflux disease)     Hyperlipidemia     Hypertension     Insomnia     Lumbar disc herniation        Past Surgical History:        Procedure Laterality Date    CARDIAC SURGERY  1986    open heart for hole in heart    COLONOSCOPY      DILATION AND CURETTAGE OF UTERUS      UPPER GASTROINTESTINAL ENDOSCOPY  12/2/2014    VASCULAR SURGERY      r leg       Social History:    Social History     Tobacco Use    Smoking status: Never Smoker    Smokeless tobacco: Never Used   Substance Use Topics    Alcohol use:  Yes     Alcohol/week: 7.0 standard drinks     Types: 7 Cans of beer per week                                Counseling given: Not Answered      Vital Signs (Current):   Vitals:    05/21/20 1016 05/29/20 1111   BP:  (!) 171/104   Pulse:  95   Resp:  16   Temp:  97.2 °F (36.2 °C)   TempSrc:  Temporal   SpO2:  99%   Weight: 150 lb (68 kg) 150 lb (68 kg)   Height: 5' 5\" (1.651 m) 5' 5\" (1.651 m)                                              BP Readings from Last 3 Encounters:   05/29/20 (!) 171/104   05/13/20 (!) 158/78   12/04/19 (!) 164/80       NPO Status: Time of last liquid GI/Hepatic/Renal: Neg GI/Hepatic/Renal ROS  (+) GERD: well controlled,      (-) liver disease and no renal disease       Endo/Other: Negative Endo/Other ROS       (-) diabetes mellitus               Abdominal:           Vascular: negative vascular ROS. Anesthesia Plan      general     ASA 3     (I discussed with the patient the risks and benefits of PIV, general anesthesia, IV Narcotics, PACU. All questions were answered the patient agrees with the plan)  Induction: intravenous. MIPS: Prophylactic antiemetics administered. Anesthetic plan and risks discussed with patient and child/children. Plan discussed with CRNA.                   Junior Christiano MD   5/29/2020

## 2020-05-29 NOTE — LETTER
Ortho & Spine  SURGERY FEE TICKET FOR NAYA SANDHU:      DEMOGRAPHICS:                                                                                                              .    Patient Name:  Jonathan Roberts  Patient :  1945   Patient SS#:  [id number]    Patient Phone:  [phone] (home)  Alt.  Patient Phone:    Patient Address:  [address] [facility] [address]    PCP:  Burton Pardo MD  Insurance:  Payor: MEDICARE / Plan: MEDICARE PART A AND B / Product Type: *No Product type* /   Insurance ID Number:    DIAGNOSIS & PROCEDURE:                                                                                            .    Diagnosis:   Lumbar HNP    Operation:  MICROLUMBAR DISCECTOMY L5-S1    Location:  [facility]    Date of Surgery:  20    Surgeon Assisted:  Evin Hylton PA-C    20     BILLING INFORMATION:                                                                                                    .    Procedure:       CPT Code Modifier      KMP Assist in Surgery

## 2020-06-08 ENCOUNTER — TELEPHONE (OUTPATIENT)
Dept: ORTHOPEDIC SURGERY | Age: 75
End: 2020-06-08

## 2020-06-08 NOTE — TELEPHONE ENCOUNTER
Spoke with patient and she states that she feel the other day off a chair. I moved her post op appt up.

## 2020-06-09 ENCOUNTER — OFFICE VISIT (OUTPATIENT)
Dept: ORTHOPEDIC SURGERY | Age: 75
End: 2020-06-09

## 2020-06-09 VITALS — BODY MASS INDEX: 24.98 KG/M2 | WEIGHT: 149.91 LBS | HEIGHT: 65 IN

## 2020-06-09 PROCEDURE — 99024 POSTOP FOLLOW-UP VISIT: CPT | Performed by: ORTHOPAEDIC SURGERY

## 2020-06-09 NOTE — PROGRESS NOTES
Ms. Shanta Swanson returns today 2 weeks s/p MLD L5-S1. She reports marked improvement of her leg symptoms. She fell yesterday when the chair she was sitting in broke. That increased her low back pain. Her incisions show no signs of infection. She has a normal gait and 5/5 strength of her ankle DFs/PFs, bilaterally. Her sensation is intact from L3 to S1 bilaterally. I reviewed AP and lateral x-rays of her lumbar spine that were obtained in the office today. Those show degenerative changes but no evidence of fracture. I cautioned her to avoid lifting more than 10 pounds, bending and impact type aerobic exercise for 8 week after surgery, then slowly increase her activity over the next month.

## 2020-06-30 ENCOUNTER — TELEPHONE (OUTPATIENT)
Dept: ORTHOPEDIC SURGERY | Age: 75
End: 2020-06-30

## 2020-07-15 ENCOUNTER — OFFICE VISIT (OUTPATIENT)
Dept: ORTHOPEDIC SURGERY | Age: 75
End: 2020-07-15
Payer: MEDICARE

## 2020-07-15 VITALS — BODY MASS INDEX: 24.83 KG/M2 | HEIGHT: 65 IN | WEIGHT: 149 LBS

## 2020-07-15 PROBLEM — M23.204 DEGENERATIVE TEAR OF MEDIAL MENISCUS OF LEFT KNEE: Status: ACTIVE | Noted: 2020-07-15

## 2020-07-15 PROBLEM — M75.41 ROTATOR CUFF IMPINGEMENT SYNDROME OF RIGHT SHOULDER: Status: ACTIVE | Noted: 2020-07-15

## 2020-07-15 PROBLEM — M17.12 PRIMARY OSTEOARTHRITIS OF LEFT KNEE: Status: ACTIVE | Noted: 2020-07-15

## 2020-07-15 PROCEDURE — 1036F TOBACCO NON-USER: CPT | Performed by: ORTHOPAEDIC SURGERY

## 2020-07-15 PROCEDURE — 1090F PRES/ABSN URINE INCON ASSESS: CPT | Performed by: ORTHOPAEDIC SURGERY

## 2020-07-15 PROCEDURE — G8399 PT W/DXA RESULTS DOCUMENT: HCPCS | Performed by: ORTHOPAEDIC SURGERY

## 2020-07-15 PROCEDURE — G8420 CALC BMI NORM PARAMETERS: HCPCS | Performed by: ORTHOPAEDIC SURGERY

## 2020-07-15 PROCEDURE — 1123F ACP DISCUSS/DSCN MKR DOCD: CPT | Performed by: ORTHOPAEDIC SURGERY

## 2020-07-15 PROCEDURE — G8427 DOCREV CUR MEDS BY ELIG CLIN: HCPCS | Performed by: ORTHOPAEDIC SURGERY

## 2020-07-15 PROCEDURE — 4040F PNEUMOC VAC/ADMIN/RCVD: CPT | Performed by: ORTHOPAEDIC SURGERY

## 2020-07-15 PROCEDURE — 3017F COLORECTAL CA SCREEN DOC REV: CPT | Performed by: ORTHOPAEDIC SURGERY

## 2020-07-15 PROCEDURE — 99214 OFFICE O/P EST MOD 30 MIN: CPT | Performed by: ORTHOPAEDIC SURGERY

## 2020-07-15 RX ORDER — METHYLPREDNISOLONE 4 MG/1
TABLET ORAL
Qty: 21 KIT | Refills: 0 | Status: SHIPPED | OUTPATIENT
Start: 2020-07-15 | End: 2020-08-24 | Stop reason: ALTCHOICE

## 2020-07-15 NOTE — PROGRESS NOTES
KNEE VISIT      HISTORY OF PRESENT ILLNESS    Indira Young is a 69 y.o. female who presents for reevaluation of left knee and right shoulder. The left knee she has had persistent pain in the knee and says that the even though she has had periodic cortisone injections in the past they are not giving her the relief she would desire. She also has some pain in the right shoulder with range of motion overhead use. Both pain she grades about 6/10. She denies any history of trauma and otherwise at this point. ROS    Well-documented in patient history form dated 7/15/2020  All other ROS negative except for above.     Past Surgical history    Past Surgical History:   Procedure Laterality Date   [address]    open heart for hole in heart    COLONOSCOPY      DILATION AND CURETTAGE OF UTERUS      LUMBAR SPINE SURGERY N/A 5/29/2020    MICROLUMBAR DISCECTOMY L5-S1 performed by Anahy Tirado MD at [address] [place] [address]  12/2/2014    VASCULAR SURGERY      r leg       PAST MEDICAL    Past Medical History:   Diagnosis Date    Anxiety     GERD (gastroesophageal reflux disease)     Hyperlipidemia     Hypertension     Insomnia     Lumbar disc herniation        Allergies    Allergies   Allergen Reactions    Iv Dye [Iodides]      GI symptoms       Meds    Current Outpatient Medications   Medication Sig Dispense Refill    methylPREDNISolone (MEDROL, TRENT,) 4 MG tablet BURST THERAPY 21 kit 0    DULoxetine (CYMBALTA) 30 MG extended release capsule Take 30 mg by mouth daily       ibuprofen (ADVIL;MOTRIN) 600 MG tablet Take 400 mg by mouth every 4-6 hours as needed for Pain      losartan-hydrochlorothiazide (HYZAAR) 100-12.5 MG per tablet Take 1 tablet by mouth daily 90 tablet 3    gabapentin (NEURONTIN) 300 MG capsule TAKE ONE CAPSULE BY MOUTH THREE TIMES A DAY 90 capsule 0    amLODIPine (NORVASC) 5 MG tablet Take 1 tablet by mouth daily 30 tablet 11    Prenatal Multivit-Min-Fe-FA (PRENATAL VITAMINS PO) Take 1 tablet by mouth daily       atorvastatin (LIPITOR) 10 MG tablet Take 1 tablet by mouth daily (Patient taking differently: Take 10 mg by mouth daily 5/21/20 - currently not taking) 90 tablet 3    omeprazole (PRILOSEC) 20 MG delayed release capsule Take 40 mg by mouth every other day Dr Mervin Khan rx       rOPINIRole (REQUIP) 1 MG tablet Take 1 mg by mouth nightly       No current facility-administered medications for this visit. Social    Social History     Socioeconomic History    Marital status:      Spouse name: Not on file    Number of children: Not on file    Years of education: Not on file    Highest education level: Not on file   Occupational History    Not on file   Social Needs    Financial resource strain: Not on file    Food insecurity     Worry: Not on file     Inability: Not on file    Transportation needs     Medical: Not on file     Non-medical: Not on file   Tobacco Use    Smoking status: Never Smoker    Smokeless tobacco: Never Used   Substance and Sexual Activity    Alcohol use: Yes     Alcohol/week: 7.0 standard drinks     Types: 7 Cans of beer per week    Drug use: No    Sexual activity: Not on file     Comment: occ.    Lifestyle    Physical activity     Days per week: Not on file     Minutes per session: Not on file    Stress: Not on file   Relationships    Social connections     Talks on phone: Not on file     Gets together: Not on file     Attends [employer] service: Not on file     Active member of club or organization: Not on file     Attends meetings of clubs or organizations: Not on file     Relationship status: Not on file    Intimate partner violence     Fear of current or ex partner: Not on file     Emotionally abused: Not on file     Physically abused: Not on file     Forced sexual activity: Not on file   Other Topics Concern    Not on file   Social History Narrative    Not on file       Family HISTORY Family History   Problem Relation Age of Onset    Other Mother         COPD    Cancer Father        PHYSICAL EXAM    Vital Signs:  Ht 5' 5\" (1.651 m)   Wt 149 lb (67.6 kg)   BMI 24.79 kg/m²   General Appearance:  Normal body habitus. Alert and oriented to person, place, and time. Affect:  Normal.   Gait:  Normal. Good balance and coordination. Skin:  Intact. Sensation:  Intact. Strength:  Intact. Reflexes:  Intact. Pulses:  Intact. Knee Exam:    Effusion: 1+ left knee    Range of Motion Right Left   Extension 0 0   Flexion 125 110     Provocative Test Right Left    Positive Negative Positive Negative   Anterior drawer [] [x] [] [x]   Lachman [] [x] [] [x]   Posterior drawer [] [x] [] [x]   Varus testing [] [x] [] [x]   Valgus testing [] [x] [x] []   Joint line tenderness [] [x] [x] []     Additional Exam Comments: Her neurocirculatory lymphatic exam otherwise is normal symmetric to both lower extremities. She has medial joint line tenderness to direct palpation and valgus stress as well as Carlos's maneuver. On exam to her right shoulder she has nearly full active and passive range of motion with painful arc positive impingement sign and weakness of rotator cuff function. She has some crepitus in the shoulder which is concerning and may be consistent with rotator cuff tear. Her neurocirculatory lymphatic exam otherwise is normal symmetric to both upper extremities. IMAGING STUDIES    X-rays 3 views right shoulder that she brought with her are unremarkable for acute or chronic pathology    X-rays 3 views left knee demonstrate some Gifford changes but good maintenance of joint space greater than 50%. IMPRESSION    Left knee pain secondary to degenerative medial meniscus tear and osteoarthritis    Right rotator cuff impingement syndrome with possible rotator cuff tear    PLAN      1.   Conservative care options including physical therapy, NSAIDs, bracing, and activity modification were discussed. 2.  The indications for therapeutic injections were discussed. 3.  The indications for additional imaging studies were discussed. 4.  After considering the various options discussed, the patient elected to pursue a course that includes an MRI of the left knee, a Medrol Dosepak, and a physician directed therapy program for the right shoulder.

## 2020-07-27 ENCOUNTER — HOSPITAL ENCOUNTER (OUTPATIENT)
Dept: MRI IMAGING | Age: 75
Discharge: HOME | End: 2020-07-27
Payer: MEDICARE

## 2020-07-27 PROCEDURE — 73721 MRI JNT OF LWR EXTRE W/O DYE: CPT

## 2020-08-10 RX ORDER — RIVAROXABAN 15 MG/1
TABLET, FILM COATED ORAL
Qty: 30 TABLET | Refills: 8 | Status: SHIPPED | OUTPATIENT
Start: 2020-08-10 | End: 2020-11-23 | Stop reason: SDUPTHER

## 2020-08-10 NOTE — TELEPHONE ENCOUNTER
LOV 5/13/20 pt stopped taking Xarelto due to  frequently taking ibuprofen (for back pain)  and increased bleeding risk. Plan:  1. Meds reviewed. Refills as warranted   2. Ok to proceed with scheduled back surgery with Dr. Adamaris Martin. Stable cardiac status. No further testing warranted. 3. Recommend resuming Lipitor   4. Follow up with me in 6 months   5 We can discuss further after back surgery and resuming anticoagulation. Risks of thromboembolism discussed with her. She had Microlumbar disectomy with Dr. Brice Su 5/29/20. Xarelto is pending if you want her to resume.

## 2020-08-11 ENCOUNTER — VIRTUAL VISIT (OUTPATIENT)
Dept: ORTHOPEDIC SURGERY | Age: 75
End: 2020-08-11
Payer: MEDICARE

## 2020-08-11 PROBLEM — M84.469A INSUFFICIENCY FRACTURE OF TIBIA: Status: ACTIVE | Noted: 2020-08-11

## 2020-08-11 PROCEDURE — 3017F COLORECTAL CA SCREEN DOC REV: CPT | Performed by: ORTHOPAEDIC SURGERY

## 2020-08-11 PROCEDURE — G8420 CALC BMI NORM PARAMETERS: HCPCS | Performed by: ORTHOPAEDIC SURGERY

## 2020-08-11 PROCEDURE — 1123F ACP DISCUSS/DSCN MKR DOCD: CPT | Performed by: ORTHOPAEDIC SURGERY

## 2020-08-11 PROCEDURE — 99212 OFFICE O/P EST SF 10 MIN: CPT | Performed by: ORTHOPAEDIC SURGERY

## 2020-08-11 PROCEDURE — G8427 DOCREV CUR MEDS BY ELIG CLIN: HCPCS | Performed by: ORTHOPAEDIC SURGERY

## 2020-08-11 PROCEDURE — 1036F TOBACCO NON-USER: CPT | Performed by: ORTHOPAEDIC SURGERY

## 2020-08-11 PROCEDURE — 4040F PNEUMOC VAC/ADMIN/RCVD: CPT | Performed by: ORTHOPAEDIC SURGERY

## 2020-08-11 PROCEDURE — G8399 PT W/DXA RESULTS DOCUMENT: HCPCS | Performed by: ORTHOPAEDIC SURGERY

## 2020-08-11 PROCEDURE — 1090F PRES/ABSN URINE INCON ASSESS: CPT | Performed by: ORTHOPAEDIC SURGERY

## 2020-08-11 NOTE — PROGRESS NOTES
Lynsey Livingston is a 69 y.o. female evaluated via telephone on 8/11/2020. Consent:  She and/or health care decision maker is aware that that she may receive a bill for this telephone service, depending on her insurance coverage, and has provided verbal consent to proceed: Yes      Documentation:  I communicated with the patient and/or health care decision maker about the MRI of her left knee. Details of this discussion including any medical advice provided: I discussed the MRI report with her which says she has a medial meniscus tear and an insufficiency fracture of the medial tibial plateau. I explained to her my recommendation of surgical intervention to include video arthroscopy with partial medial meniscectomy and sub-chondroplasty of the medial tibial plateau. She and her son who is on the call also, appear to understand the diagnosis and my recommended treatment as well as its inherent risks and perioperative management. After an evaluation of this patient and a review of her radiographic testing, it would be my opinion that the symptoms, for which I am treating her are mechanical in origin. Although she has concomitant osteoarthritic changes, her joint space has greater than 50% of the articular surfaces left within the knee compartment. Additionally she is failed to improve over time with a physician directed physical therapy program, consideration of bracing, medications, and injections. It is with those observations both objective and subjective that I would recommend proceeding with arthroscopic intervention to her need to address the meniscal pathology. INFORMED CONSENT NOTE        We discussed the risks, benefits, and alternatives to the proposed procedure, as well as the necessity of other members of the healthcare team participating in the procedure. All questions were answered and the patient elected to proceed with the proposed procedure and signed the informed consent form. The patient was counseled at length about the risks of svitlana Covid-19 during their perioperative period and any recovery window from their procedure. The patient was made aware that svitlana Covid-19  may worsen their prognosis for recovering from their procedure  and lend to a higher morbidity and/or mortality risk. All material risks, benefits, and reasonable alternatives including postponing the procedure were discussed. The patient does wish to proceed with the procedure at this time. I affirm this is a Patient Initiated Episode with a Patient who has not had a related appointment within my department in the past 7 days or scheduled within the next 24 hours.     Patient identification was verified at the start of the visit: Yes    Total Time: minutes: 5-10 minutes    Note: not billable if this call serves to triage the patient into an appointment for the relevant concern      Sharyle Eden

## 2020-08-12 PROBLEM — M23.204 DEGENERATIVE TEAR OF LEFT MEDIAL MENISCUS: Status: ACTIVE | Noted: 2020-08-12

## 2020-08-18 ENCOUNTER — TELEPHONE (OUTPATIENT)
Dept: ORTHOPEDIC SURGERY | Age: 75
End: 2020-08-18

## 2020-08-18 NOTE — TELEPHONE ENCOUNTER
Auth: NPR  Date: 08/28/20  Type of SX: OUTPATIENT  Location: [facility]  CPT [id number], [id number]   SX area: L KNEE  Insurance: MEDICARE

## 2020-08-24 NOTE — PROGRESS NOTES
Preoperative Screening for Elective Surgery/Invasive Procedures While COVID-19 present in the community     Have you had any of the following symptoms?no  o Fever, chills  o Cough  o Shortness of breath  o Muscle aches/pain  o Diarrhea  o Abdominal pain, nausea, vomiting  o Loss or decrease in taste and / or smell   Risk of Exposure  o Have you recently been hospitalized for COVID-19 or flu-like illness, if so when?no  o Recently diagnosed with COVID-19, if so when?no  o Recently tested for COVID-19, if so when?no  o Have you been in close contact with a person or family member who currently has or recently had COVID-19? If yes, when and in what context?no  o Do you live with anybody who in the last 14 days has had fever, chills, shortness of breath, muscle aches, flu-like illness?no  o Do you have any close contacts or family members who are currently in the hospital for COVID-19 or flu-like illness? If yes, assess recent close contact with this person. no    Indicate if the patient has a positive screen by answering yes to one or more of the above questions. Patients who test positive or screen positive prior to surgery or on the day of surgery should be evaluated in conjunction with the surgeon/proceduralist/anesthesiologist to determine the urgency of the procedure.

## 2020-08-24 NOTE — PROGRESS NOTES
PRE OP INSTRUCTION SHEET   1. Do not eat or drink anything after 12 midnight  prior to surgery. This includes no water, chewing gum or mints. 2. Take the following pills will a small sip of water (see MAR)                                        3. Aspirin, Ibuprofen, Advil, Naproxen, Vitamin E, fish oil and other Anti-inflammatory products should be stopped for 5 days before surgery or as directed by your physician. 4. Check with your Doctor regarding stopping Plavix, Coumadin, Lovenox, Fragmin or other blood thinners   5. Do not smoke, and do not drink any alcoholic beverages 24 hours prior to surgery. This includes NA Beer. 6. You may brush your teeth and gargle the morning of surgery. DO NOT SWALLOW WATER   7. You MUST make arrangements for a responsible adult to take you home after your surgery. You will not be allowed to leave alone or drive yourself home. It is strongly suggested someone stay with you the first 24 hrs. Your surgery will be cancelled if you do not have a ride home. 8. A parent/legal guardian must accompany a child scheduled for surgery and plan to stay at the hospital until the child is discharged. Please do not bring other children with you. 9. Please wear simple, loose fitting clothing to the hospital.  Marrianne Sprain not bring valuables (money, credit cards, checkbooks, etc.) Do not wear any makeup (including no eye makeup) or nail polish on your fingers or toes. 10. DO NOT wear any jewelry or piercings on day of surgery. All body piercing jewelry must be removed. 11. If you have dentures,glasses, or contacts they will be removed before going to the OR; we will provide you a container. 12. Please see your family doctor/and cardiologist for a history & physical and/or concerning medications. Bring any test results/reports from your physician's office. Have history and labs faxed to [phone].  Remember to bring Blood Bank bracelet on the day of surgery. 14. If you have a Living Will and Durable Power of  for Healthcare, please bring in a copy. 13. Notify your Surgeon if you develop any illness between now and surgery  time, cough, cold, fever, sore throat, nausea, vomiting, etc.  Please notify your surgeon if you experience dizziness, shortness of breath or blurred vision between now & the time of your surgery   16. DO NOT shave your operative site 96 hours prior to surgery. For face & neck surgery, men may use an electric razor 48 hours prior to surgery. 17. Shower with _x__Antibacterial soap (x_chlorhexidine for total joint  Pt's) shower two times before surgery.(the morning of and the night before. 18. To provide excellent care visitors will be limited to one in the room at any given time.   Please call pre admission testing if you any further questions [phone] or [phone]

## 2020-08-25 ENCOUNTER — HOSPITAL ENCOUNTER (OUTPATIENT)
Age: 75
Discharge: HOME | End: 2020-08-25
Payer: MEDICARE

## 2020-08-25 PROCEDURE — U0003 INFECTIOUS AGENT DETECTION BY NUCLEIC ACID (DNA OR RNA); SEVERE ACUTE RESPIRATORY SYNDROME CORONAVIRUS 2 (SARS-COV-2) (CORONAVIRUS DISEASE [COVID-19]), AMPLIFIED PROBE TECHNIQUE, MAKING USE OF HIGH THROUGHPUT TECHNOLOGIES AS DESCRIBED BY CMS-2020-01-R: HCPCS

## 2020-08-26 LAB — SARS-COV-2, NAA: NOT DETECTED

## 2020-08-30 ENCOUNTER — ANESTHESIA EVENT (OUTPATIENT)
Dept: OPERATING ROOM | Age: 75
End: 2020-08-30
Payer: MEDICARE

## 2020-08-30 NOTE — ANESTHESIA PRE PROCEDURE
Department of Anesthesiology  Preprocedure Note       Name:  Maribel Do   Age:  69 y.o.  :  1945                                          MRN:  [id number]         Date:  2020      Surgeon:  Deedee Perez MD    Procedure:  LEFT KNEE VIDEO ARTHROSCOPY, MEDIAL MENISECTOMY, INTERNAL FIXATION MEDIAL TIBIAL PLATEAU INSUFFICIENCY FRACTURE WITH BONE SUBSTITUTE    HPI:  69 y.o. female who has a PMH of HTN, HLD, AFIB with controlled rates, DVT year ago, varicose veins, GERD and anxiety. She presentd for evaluation of left knee pain that she has had for many months. Says she has fallen a few times in has increasing pain mostly twisting motions. She grades her pain at worst 10/10 but feels it is getting worse and accompanied by swelling. She takes Advil for relief.      Medications prior to admission:    XARELTO 15 MG LD: 7 days ago TAKE 1 TABLET BY MOUTH DAILY   ibuprofen (ADVIL;MOTRIN) 600 MG  Take 400 mg by mouth every 4-6 hours as needed for Pain   losartan-hydrochlorothiazide100-12.5 MG Take 1 tablet by mouth daily   amLODIPine (NORVASC) 5 MG tablet Take 1 tablet by mouth daily   Prenatal Multivit-Min-Fe-FA  Take 1 tablet by mouth daily    atorvastatin (LIPITOR) 10 MG tablet Take 10 mg by mouth daily 20 - currently not taking   omeprazole (PRILOSEC) 20 MG  Take 40 mg by mouth every other day Dr Shirlene De La Rosa rx    rOPINIRole (REQUIP) 1 MG tablet Take 1 mg by mouth nightly     Allergies:     Iv Dye [Iodides]      GI symptoms     Problem List:     Hypertension    Mixed hyperlipidemia    Palpitations    Patent foramen ovale    Chronic atrial fibrillation    Syncope    Other chest pain    Chronic pain of left knee    Pain of left lower extremity    DDD (degenerative disc disease), lumbar    Left sided sciatica    Primary osteoarthritis of left knee    Rotator cuff impingement syndrome of right shoulder    Insufficiency fracture of tibia    Degenerative tear of left medial meniscus     Past not a current smoker                           Cardiovascular:  Exercise tolerance: good (>4 METS),   (+) hypertension:, dysrhythmias: atrial fibrillation,     (-) murmur    ECG reviewed    Rate: normal  Echocardiogram reviewed               ROS comment: EKG 6/26/19  afib rate control. T wave inversion consider anterior  ischemia     ECHO 5/15/17  Normal LV systolic function with an estimated LVEF of 55%. Sigmoid-shaped basal septum. Mild concentric LVH. Elevated left ventricular diastolic filling pressure. The left atrium is mildly dilated. The right atrium is moderately dilated. Mild mitral annular calcification. Mild mitral regurgitation. Aortic valve appears tricuspid and sclerotic but opens adequately. Moderate-severe tricuspid regurgitation. Systolic pulmonary artery pressure (SPAP) is normal.     Neuro/Psych:   (+) depression/anxiety    (-) seizures, TIA and CVA            ROS comment: LUMBAR SPINE SURGERY (5/29/2020) MICROLUMBAR DISCECTOMY L5-S1 GI/Hepatic/Renal:   (+) GERD: well controlled,      (-) liver disease and no renal disease       Endo/Other:    (+) : arthritis: OA., .    (-) diabetes mellitus, hypothyroidism               Abdominal:           Vascular:   + DVT, .        ROS comment: Remote DVT- 1967- after childbirth . Anesthesia Plan      general     ASA 3     (FN, ON and LFCN Block(s) for post-op pain management per surgeon request.)  Induction: intravenous. MIPS: Prophylactic antiemetics administered. Anesthetic plan and risks discussed with patient. Plan discussed with CRNA.           Randolph Brown MD

## 2020-08-31 ENCOUNTER — ANESTHESIA (OUTPATIENT)
Dept: OPERATING ROOM | Age: 75
End: 2020-08-31
Payer: MEDICARE

## 2020-08-31 ENCOUNTER — HOSPITAL ENCOUNTER (OUTPATIENT)
Age: 75
Setting detail: OUTPATIENT SURGERY
Discharge: HOME | End: 2020-08-31
Attending: ORTHOPAEDIC SURGERY | Admitting: ORTHOPAEDIC SURGERY
Payer: MEDICARE

## 2020-08-31 ENCOUNTER — APPOINTMENT (OUTPATIENT)
Dept: GENERAL RADIOLOGY | Age: 75
End: 2020-08-31
Attending: ORTHOPAEDIC SURGERY
Payer: MEDICARE

## 2020-08-31 VITALS — TEMPERATURE: 95.7 F | DIASTOLIC BLOOD PRESSURE: 57 MMHG | SYSTOLIC BLOOD PRESSURE: 82 MMHG | OXYGEN SATURATION: 99 %

## 2020-08-31 VITALS
SYSTOLIC BLOOD PRESSURE: 167 MMHG | WEIGHT: 150 LBS | TEMPERATURE: 97.2 F | BODY MASS INDEX: 24.99 KG/M2 | OXYGEN SATURATION: 98 % | HEIGHT: 65 IN | HEART RATE: 101 BPM | RESPIRATION RATE: 14 BRPM | DIASTOLIC BLOOD PRESSURE: 76 MMHG

## 2020-08-31 LAB
EKG ATRIAL RATE: 625 BPM
EKG DIAGNOSIS: NORMAL
EKG Q-T INTERVAL: 400 MS
EKG QRS DURATION: 136 MS
EKG QTC CALCULATION (BAZETT): 500 MS
EKG R AXIS: -56 DEGREES
EKG T AXIS: 117 DEGREES
EKG VENTRICULAR RATE: 94 BPM

## 2020-08-31 PROCEDURE — 7100000000 HC PACU RECOVERY - FIRST 15 MIN: Performed by: ORTHOPAEDIC SURGERY

## 2020-08-31 PROCEDURE — 7100000011 HC PHASE II RECOVERY - ADDTL 15 MIN: Performed by: ORTHOPAEDIC SURGERY

## 2020-08-31 PROCEDURE — C1713 ANCHOR/SCREW BN/BN,TIS/BN: HCPCS | Performed by: ORTHOPAEDIC SURGERY

## 2020-08-31 PROCEDURE — 2580000003 HC RX 258: Performed by: ORTHOPAEDIC SURGERY

## 2020-08-31 PROCEDURE — L1830 KO IMMOB CANVAS LONG PRE OTS: HCPCS | Performed by: ORTHOPAEDIC SURGERY

## 2020-08-31 PROCEDURE — 3600000004 HC SURGERY LEVEL 4 BASE: Performed by: ORTHOPAEDIC SURGERY

## 2020-08-31 PROCEDURE — 7100000001 HC PACU RECOVERY - ADDTL 15 MIN: Performed by: ORTHOPAEDIC SURGERY

## 2020-08-31 PROCEDURE — 7100000010 HC PHASE II RECOVERY - FIRST 15 MIN: Performed by: ORTHOPAEDIC SURGERY

## 2020-08-31 PROCEDURE — 2720000010 HC SURG SUPPLY STERILE: Performed by: ORTHOPAEDIC SURGERY

## 2020-08-31 PROCEDURE — 3700000001 HC ADD 15 MINUTES (ANESTHESIA): Performed by: ORTHOPAEDIC SURGERY

## 2020-08-31 PROCEDURE — 6360000002 HC RX W HCPCS: Performed by: ANESTHESIOLOGY

## 2020-08-31 PROCEDURE — 2500000003 HC RX 250 WO HCPCS: Performed by: ORTHOPAEDIC SURGERY

## 2020-08-31 PROCEDURE — 2500000003 HC RX 250 WO HCPCS: Performed by: NURSE ANESTHETIST, CERTIFIED REGISTERED

## 2020-08-31 PROCEDURE — 2580000003 HC RX 258: Performed by: ANESTHESIOLOGY

## 2020-08-31 PROCEDURE — 3700000000 HC ANESTHESIA ATTENDED CARE: Performed by: ORTHOPAEDIC SURGERY

## 2020-08-31 PROCEDURE — 76942 ECHO GUIDE FOR BIOPSY: CPT | Performed by: ANESTHESIOLOGY

## 2020-08-31 PROCEDURE — 2500000003 HC RX 250 WO HCPCS: Performed by: ANESTHESIOLOGY

## 2020-08-31 PROCEDURE — 6360000002 HC RX W HCPCS: Performed by: NURSE ANESTHETIST, CERTIFIED REGISTERED

## 2020-08-31 PROCEDURE — 3600000014 HC SURGERY LEVEL 4 ADDTL 15MIN: Performed by: ORTHOPAEDIC SURGERY

## 2020-08-31 PROCEDURE — 93005 ELECTROCARDIOGRAM TRACING: CPT | Performed by: ANESTHESIOLOGY

## 2020-08-31 PROCEDURE — 3209999900 FLUORO FOR SURGICAL PROCEDURES

## 2020-08-31 PROCEDURE — 93010 ELECTROCARDIOGRAM REPORT: CPT | Performed by: INTERNAL MEDICINE

## 2020-08-31 PROCEDURE — 64447 NJX AA&/STRD FEMORAL NRV IMG: CPT | Performed by: ANESTHESIOLOGY

## 2020-08-31 PROCEDURE — 6360000002 HC RX W HCPCS: Performed by: ORTHOPAEDIC SURGERY

## 2020-08-31 PROCEDURE — 2709999900 HC NON-CHARGEABLE SUPPLY: Performed by: ORTHOPAEDIC SURGERY

## 2020-08-31 DEVICE — IMPLANTABLE DEVICE: Type: IMPLANTABLE DEVICE | Site: KNEE | Status: FUNCTIONAL

## 2020-08-31 RX ORDER — ONDANSETRON 2 MG/ML
INJECTION INTRAMUSCULAR; INTRAVENOUS PRN
Status: DISCONTINUED | OUTPATIENT
Start: 2020-08-31 | End: 2020-08-31 | Stop reason: SDUPTHER

## 2020-08-31 RX ORDER — ONDANSETRON 2 MG/ML
4 INJECTION INTRAMUSCULAR; INTRAVENOUS
Status: DISCONTINUED | OUTPATIENT
Start: 2020-08-31 | End: 2020-08-31 | Stop reason: HOSPADM

## 2020-08-31 RX ORDER — PROMETHAZINE HYDROCHLORIDE 25 MG/ML
6.25 INJECTION, SOLUTION INTRAMUSCULAR; INTRAVENOUS
Status: DISCONTINUED | OUTPATIENT
Start: 2020-08-31 | End: 2020-08-31 | Stop reason: HOSPADM

## 2020-08-31 RX ORDER — MIDAZOLAM HYDROCHLORIDE 1 MG/ML
INJECTION INTRAMUSCULAR; INTRAVENOUS PRN
Status: DISCONTINUED | OUTPATIENT
Start: 2020-08-31 | End: 2020-08-31 | Stop reason: SDUPTHER

## 2020-08-31 RX ORDER — MEPERIDINE HYDROCHLORIDE 25 MG/ML
12.5 INJECTION INTRAMUSCULAR; INTRAVENOUS; SUBCUTANEOUS EVERY 5 MIN PRN
Status: DISCONTINUED | OUTPATIENT
Start: 2020-08-31 | End: 2020-08-31 | Stop reason: HOSPADM

## 2020-08-31 RX ORDER — OXYCODONE HYDROCHLORIDE AND ACETAMINOPHEN 5; 325 MG/1; MG/1
1 TABLET ORAL PRN
Status: DISCONTINUED | OUTPATIENT
Start: 2020-08-31 | End: 2020-08-31 | Stop reason: HOSPADM

## 2020-08-31 RX ORDER — HYDROCODONE BITARTRATE AND ACETAMINOPHEN 7.5; 325 MG/1; MG/1
1 TABLET ORAL EVERY 6 HOURS PRN
Qty: 28 TABLET | Refills: 0 | Status: SHIPPED | OUTPATIENT
Start: 2020-08-31 | End: 2020-09-07

## 2020-08-31 RX ORDER — FENTANYL CITRATE 50 UG/ML
25 INJECTION, SOLUTION INTRAMUSCULAR; INTRAVENOUS EVERY 5 MIN PRN
Status: DISCONTINUED | OUTPATIENT
Start: 2020-08-31 | End: 2020-08-31 | Stop reason: HOSPADM

## 2020-08-31 RX ORDER — HYDRALAZINE HYDROCHLORIDE 20 MG/ML
5 INJECTION INTRAMUSCULAR; INTRAVENOUS EVERY 10 MIN PRN
Status: DISCONTINUED | OUTPATIENT
Start: 2020-08-31 | End: 2020-08-31 | Stop reason: HOSPADM

## 2020-08-31 RX ORDER — OXYCODONE HYDROCHLORIDE AND ACETAMINOPHEN 5; 325 MG/1; MG/1
2 TABLET ORAL PRN
Status: DISCONTINUED | OUTPATIENT
Start: 2020-08-31 | End: 2020-08-31 | Stop reason: HOSPADM

## 2020-08-31 RX ORDER — SODIUM CHLORIDE 0.9 % (FLUSH) 0.9 %
10 SYRINGE (ML) INJECTION PRN
Status: DISCONTINUED | OUTPATIENT
Start: 2020-08-31 | End: 2020-08-31 | Stop reason: HOSPADM

## 2020-08-31 RX ORDER — MAGNESIUM HYDROXIDE 1200 MG/15ML
LIQUID ORAL CONTINUOUS PRN
Status: COMPLETED | OUTPATIENT
Start: 2020-08-31 | End: 2020-08-31

## 2020-08-31 RX ORDER — SODIUM CHLORIDE, SODIUM LACTATE, POTASSIUM CHLORIDE, CALCIUM CHLORIDE 600; 310; 30; 20 MG/100ML; MG/100ML; MG/100ML; MG/100ML
INJECTION, SOLUTION INTRAVENOUS CONTINUOUS
Status: DISCONTINUED | OUTPATIENT
Start: 2020-08-31 | End: 2020-08-31 | Stop reason: HOSPADM

## 2020-08-31 RX ORDER — DEXAMETHASONE SODIUM PHOSPHATE 4 MG/ML
INJECTION, SOLUTION INTRA-ARTICULAR; INTRALESIONAL; INTRAMUSCULAR; INTRAVENOUS; SOFT TISSUE PRN
Status: DISCONTINUED | OUTPATIENT
Start: 2020-08-31 | End: 2020-08-31 | Stop reason: SDUPTHER

## 2020-08-31 RX ORDER — SODIUM CHLORIDE 0.9 % (FLUSH) 0.9 %
10 SYRINGE (ML) INJECTION EVERY 12 HOURS SCHEDULED
Status: DISCONTINUED | OUTPATIENT
Start: 2020-08-31 | End: 2020-08-31 | Stop reason: HOSPADM

## 2020-08-31 RX ORDER — BUPIVACAINE HYDROCHLORIDE 2.5 MG/ML
INJECTION, SOLUTION INFILTRATION; PERINEURAL PRN
Status: DISCONTINUED | OUTPATIENT
Start: 2020-08-31 | End: 2020-08-31 | Stop reason: ALTCHOICE

## 2020-08-31 RX ORDER — PROPOFOL 10 MG/ML
INJECTION, EMULSION INTRAVENOUS PRN
Status: DISCONTINUED | OUTPATIENT
Start: 2020-08-31 | End: 2020-08-31 | Stop reason: SDUPTHER

## 2020-08-31 RX ORDER — LIDOCAINE HYDROCHLORIDE 10 MG/ML
0.3 INJECTION, SOLUTION EPIDURAL; INFILTRATION; INTRACAUDAL; PERINEURAL
Status: COMPLETED | OUTPATIENT
Start: 2020-08-31 | End: 2020-08-31

## 2020-08-31 RX ORDER — FENTANYL CITRATE 50 UG/ML
INJECTION, SOLUTION INTRAMUSCULAR; INTRAVENOUS PRN
Status: DISCONTINUED | OUTPATIENT
Start: 2020-08-31 | End: 2020-08-31 | Stop reason: SDUPTHER

## 2020-08-31 RX ORDER — BUPIVACAINE HYDROCHLORIDE AND EPINEPHRINE 5; 5 MG/ML; UG/ML
INJECTION, SOLUTION EPIDURAL; INTRACAUDAL; PERINEURAL PRN
Status: DISCONTINUED | OUTPATIENT
Start: 2020-08-31 | End: 2020-08-31 | Stop reason: SDUPTHER

## 2020-08-31 RX ORDER — LIDOCAINE HYDROCHLORIDE 20 MG/ML
INJECTION, SOLUTION EPIDURAL; INFILTRATION; INTRACAUDAL; PERINEURAL PRN
Status: DISCONTINUED | OUTPATIENT
Start: 2020-08-31 | End: 2020-08-31 | Stop reason: SDUPTHER

## 2020-08-31 RX ADMIN — VANCOMYCIN HYDROCHLORIDE 1000 MG: 1 INJECTION, POWDER, LYOPHILIZED, FOR SOLUTION INTRAVENOUS at 09:41

## 2020-08-31 RX ADMIN — LIDOCAINE HYDROCHLORIDE 0.3 ML: 10 INJECTION, SOLUTION EPIDURAL; INFILTRATION; INTRACAUDAL; PERINEURAL at 09:41

## 2020-08-31 RX ADMIN — DEXAMETHASONE SODIUM PHOSPHATE 4 MG: 4 INJECTION, SOLUTION INTRAMUSCULAR; INTRAVENOUS at 10:28

## 2020-08-31 RX ADMIN — BUPIVACAINE HYDROCHLORIDE AND EPINEPHRINE BITARTRATE 5 ML: 5; .0091 INJECTION, SOLUTION EPIDURAL; INTRACAUDAL; PERINEURAL at 10:13

## 2020-08-31 RX ADMIN — BUPIVACAINE HYDROCHLORIDE AND EPINEPHRINE BITARTRATE 10 ML: 5; .0091 INJECTION, SOLUTION EPIDURAL; INTRACAUDAL; PERINEURAL at 10:12

## 2020-08-31 RX ADMIN — FENTANYL CITRATE 25 MCG: 50 INJECTION INTRAMUSCULAR; INTRAVENOUS at 10:44

## 2020-08-31 RX ADMIN — ONDANSETRON 4 MG: 2 INJECTION, SOLUTION INTRAMUSCULAR; INTRAVENOUS at 10:28

## 2020-08-31 RX ADMIN — PHENYLEPHRINE HYDROCHLORIDE 100 MCG: 10 INJECTION INTRAVENOUS at 10:36

## 2020-08-31 RX ADMIN — PROPOFOL 150 MG: 10 INJECTION, EMULSION INTRAVENOUS at 10:23

## 2020-08-31 RX ADMIN — SODIUM CHLORIDE, POTASSIUM CHLORIDE, SODIUM LACTATE AND CALCIUM CHLORIDE: 600; 310; 30; 20 INJECTION, SOLUTION INTRAVENOUS at 09:40

## 2020-08-31 RX ADMIN — SODIUM CHLORIDE, POTASSIUM CHLORIDE, SODIUM LACTATE AND CALCIUM CHLORIDE: 600; 310; 30; 20 INJECTION, SOLUTION INTRAVENOUS at 09:39

## 2020-08-31 RX ADMIN — LIDOCAINE HYDROCHLORIDE 60 MG: 20 INJECTION, SOLUTION EPIDURAL; INFILTRATION; INTRACAUDAL; PERINEURAL at 10:23

## 2020-08-31 RX ADMIN — FENTANYL CITRATE 100 MCG: 50 INJECTION INTRAMUSCULAR; INTRAVENOUS at 10:09

## 2020-08-31 RX ADMIN — BUPIVACAINE HYDROCHLORIDE AND EPINEPHRINE BITARTRATE 10 ML: 5; .0091 INJECTION, SOLUTION EPIDURAL; INTRACAUDAL; PERINEURAL at 10:11

## 2020-08-31 RX ADMIN — FENTANYL CITRATE 25 MCG: 50 INJECTION INTRAMUSCULAR; INTRAVENOUS at 10:41

## 2020-08-31 RX ADMIN — FENTANYL CITRATE 50 MCG: 50 INJECTION INTRAMUSCULAR; INTRAVENOUS at 10:23

## 2020-08-31 RX ADMIN — BUPIVACAINE HYDROCHLORIDE AND EPINEPHRINE BITARTRATE 5 ML: 5; .0091 INJECTION, SOLUTION EPIDURAL; INTRACAUDAL; PERINEURAL at 10:14

## 2020-08-31 RX ADMIN — MIDAZOLAM 2 MG: 1 INJECTION INTRAMUSCULAR; INTRAVENOUS at 10:09

## 2020-08-31 RX ADMIN — BUPIVACAINE HYDROCHLORIDE AND EPINEPHRINE BITARTRATE 10 ML: 5; .0091 INJECTION, SOLUTION EPIDURAL; INTRACAUDAL; PERINEURAL at 10:10

## 2020-08-31 ASSESSMENT — PULMONARY FUNCTION TESTS
PIF_VALUE: 5
PIF_VALUE: 3
PIF_VALUE: 2
PIF_VALUE: 4
PIF_VALUE: 2
PIF_VALUE: 5
PIF_VALUE: 4
PIF_VALUE: 0
PIF_VALUE: 4
PIF_VALUE: 0
PIF_VALUE: 5
PIF_VALUE: 4
PIF_VALUE: 4
PIF_VALUE: 5
PIF_VALUE: 4
PIF_VALUE: 5
PIF_VALUE: 0
PIF_VALUE: 5
PIF_VALUE: 6
PIF_VALUE: 0
PIF_VALUE: 5
PIF_VALUE: 4
PIF_VALUE: 2
PIF_VALUE: 4
PIF_VALUE: 5
PIF_VALUE: 4
PIF_VALUE: 0
PIF_VALUE: 1
PIF_VALUE: 5
PIF_VALUE: 5
PIF_VALUE: 12
PIF_VALUE: 4
PIF_VALUE: 18
PIF_VALUE: 29

## 2020-08-31 ASSESSMENT — PAIN - FUNCTIONAL ASSESSMENT: PAIN_FUNCTIONAL_ASSESSMENT: 0-10

## 2020-08-31 ASSESSMENT — PAIN SCALES - GENERAL: PAINLEVEL_OUTOF10: 0

## 2020-08-31 ASSESSMENT — LIFESTYLE VARIABLES: SMOKING_STATUS: 0

## 2020-08-31 NOTE — ANESTHESIA PROCEDURE NOTES
Peripheral Block    Patient location during procedure: pre-op  Start time: 8/31/2020 10:09 AM  End time: 8/31/2020 10:00 AM  Staffing  Anesthesiologist: Thad Kearney MD  Performed: anesthesiologist   Preanesthetic Checklist  Completed: patient identified, site marked, surgical consent, pre-op evaluation, timeout performed, IV checked, risks and benefits discussed, monitors and equipment checked, anesthesia consent given, oxygen available and patient being monitored  Peripheral Block  Patient position: supine  Prep: ChloraPrep  Patient monitoring: continuous pulse ox and IV access  Block type: Femoral lateral cutaneous  Laterality: left  Injection technique: single-shot  Procedures: ultrasound guided  Provider prep: mask and sterile gloves  Needle  Needle gauge: 21 G  Needle length: 10 cm  Needle localization: ultrasound guidance  Test dose: negative  Assessment  Injection assessment: negative aspiration for heme, no paresthesia on injection and local visualized surrounding nerve on ultrasound  Paresthesia pain: none  Slow fractionated injection: yes  Hemodynamics: stable  Additional Notes  Pt. agrees to risks, benefits and alternatives to block  Block performed at the request of the surgeon for post-operative pain management   Immediately prior to procedure a \"time out\" was called to verify the correct patient, procedure, equipment, support staff. Site/side marked as required  Side: left  Site/Approach: Anterior Hip 2 cm inferior to the ASIS  Position: Supine  Sedation: Midazolam 2 mg  +  Fentanyl  100  mcg  IV  Local Anesthetic Dose:  2%  Lido  2 ml  Aseptic technique: prepped with chlorhexidine  Ultrasound:   yes, see attached image  Local Anesthetic:  0.5% Bupivacaine with Epi 1:200K Amount: 10 ml  in 5 ml increments after negative aspiration each time. Easy injection w/o resistance and w/o pain/paresthesias. Pt tolerated procedure well. No complications.   Reason for block: post-op pain management and at surgeon's request

## 2020-08-31 NOTE — OP NOTE
[address]                 [address] [address]                                OPERATIVE REPORT    PATIENT NAME: Demario Plunkett                    :        1945  MED REC NO:   [id number]                          ROOM:  ACCOUNT NO:   [de-identified]                           ADMIT DATE: 2020  PROVIDER:     Carito Oro MD    DATE OF PROCEDURE:  2020    PREOPERATIVE DIAGNOSES:  Left knee medial meniscus tear and  insufficiency fracture of the medial tibial plateau. POSTOPERATIVE DIAGNOSES:  Left knee medial meniscus tear and  insufficiency fracture of the medial tibial plateau. OPERATION PERFORMED:  Left knee video arthroscopy with partial medial  meniscectomy and internal fixation of an insufficiency fracture of the  medial tibial plateau with bone substitute. SURGEON:  Carito Oro MD    ANESTHESIA:  General.    BLOOD LOSS:  Less than 5 mL. COMPLICATIONS:  None noted. INDICATIONS FOR OPERATION:  This is a 81-year-old female who has had a  history of persistent worsening left knee pain. She was noted to have a  medial meniscus tear on MRI, but she also was noted to have a stress  reaction on the medial tibial plateau consistent with an insufficiency  fracture. After discussing treatment options, she elected for the  recommendation of surgical intervention. DESCRIPTION OF OPERATION:  The patient was taken to the operating room  where general anesthetic was obtained. Antibiotics were given IV  piggyback preoperatively. The left knee and leg were sterilely prepped  and draped and a two-port arthroscopy of the left knee was carried out  in usual fashion using a 30-degree arthroscope. Upon entry into the  knee, she was noted to have a complex tear of the medial meniscus  midbody through the posterior horn and a partial medial meniscectomy was  performed, leaving a well-countered and balanced inner rim.   She had  proliferative synovitis in the anterior compartment, which was impinging  in the patellofemoral joint. This was also debrided and debulked with a  power debrider. Otherwise, the ACL, PCL, and lateral compartment were  free of pathology. The knee was then thoroughly irrigated and evacuated  of all loose debris and instilled with 20 mL of 0.25% Marcaine plain. The port was repaired with 4-0 nylon in a figure-of-eight fashion. Based on preoperative review of this patient's left knee MRI, the  location of the bone marrow lesion, consistent with an insufficiency or  stress fracture, in the medial tibial plateau was identified. Preoperative surgical planning allowed for determination of the optimal  method for accessing the lesion. Intraoperatively, image fluoroscopy  combined with bone targeting instruments from [address] [place] [address] were  used to guide surgical instruments into the proximity of the subchondral  medial tibial plateau fracture. Specifically, the [employer]  AccuPort injection cannula was placed in the subchondral bone. Standard  repair methodology was used to treat the subchondral bone defect in the  medial tibial plateau. Image fluoroscopy was utilized to confirm  accurate insertion of the AccuPort injection cannula into the  subchondral bone. After insertion, fracture stabilization was performed  by injecting 4 mL of [employer] AccuFill bone substitute into  the medial tibial plateau. Image fluoroscopy was used to monitor the  injection process and ensure injection of the bone substitute into the  subchondral bone, so that following polymerization the bone substitute  stabilized the fracture and facilitated fracture repair. The injection  wounds were closed and sterile dressing was applied.         Cosme Martin MD    D: 08/31/2020 11:00:30       T: 08/31/2020 13:09:23     [email or web address]  Job#: [id number]     Doc#: [id number]    CC:

## 2020-08-31 NOTE — BRIEF OP NOTE
Brief Postoperative Note      Patient: Johnathon Rose  YOB: 1945  MRN: [id number]    Date of Procedure: 8/31/2020    Pre-Op Diagnosis: LEFT KNEE MEDIAL MENISCUS TEAR, INSUFFICIENCY FRACTURE MEDIAL TIBIAL PLATEAU    Post-Op Diagnosis: Same       Procedure(s):  LEFT KNEE VIDEO ARTHROSCOPY, MEDIAL MENISECTOMY, INTERNAL FIXATION MEDIAL TIBIAL PLATEAU INSUFFICIENCY FRACTURE WITH BONE SUBSTITUTE    Surgeon(s):  Jocy Dumont MD    Assistant:  Surgical Assistant: Anne Pickup; Garret Osler Boling    Anesthesia: General    Estimated Blood Loss (mL): Minimal    Complications: None    Specimens:   * No specimens in log *    Implants:  Implant Name Type Inv.  Item Serial No.  Lot No. LRB No. Used Action   BONE SUBST VOID FILLER FX-2 [facility] - [id number] Bone/Graft/Tissue/Human/Synth BONE SUBST VOID FILLER FX-2 [facility] [id number] [employer] [id number] Left 1 Implanted         Drains: * No LDAs found *    Findings: chelo    Electronically signed by Jocy Dumont MD on 8/31/2020 at 11:49 AM

## 2020-08-31 NOTE — ANESTHESIA POSTPROCEDURE EVALUATION
Department of Anesthesiology  Postprocedure Note    Patient: Geronimo Woods  MRN: [id number]  YOB: 1945  Date of evaluation: 8/31/2020    Procedure Summary     Date:  08/31/20 Room / Location:  [place] [address] / [facility]    Anesthesia Start:  1017 Anesthesia Stop:  1103    Procedure:  LEFT KNEE VIDEO ARTHROSCOPY, MEDIAL MENISECTOMY, INTERNAL FIXATION MEDIAL TIBIAL PLATEAU INSUFFICIENCY FRACTURE WITH BONE SUBSTITUTE (Left Knee) Diagnosis:  (LEFT KNEE MEDIAL MENISCUS TEAR, INSUFFICIENCY FRACTURE MEDIAL TIBIAL PLATEAU)    Surgeon:  Sarath Peck MD Responsible Provider:  Julio Cesar Hart MD    Anesthesia Type:  general ASA Status:  3        Anesthesia Type: general    Jena Phase I: Jena Score: 9    Jena Phase II: Jena Score: 9    Last vitals: Reviewed and per EMR flowsheets.      Anesthesia Post Evaluation   Anesthetic Problems: no   Cardiovascular System Stable: yes  Respiratory Function: Airway Patent yes  ETT no  Ventilator no  Level of consciousness: awake, alert and oriented  Post-op pain: adequate analgesia  Hydration Adequate: yes  Nausea/Vomiting:no  Other Issues:     Edie Stafford MD

## 2020-08-31 NOTE — PROGRESS NOTES
. Assessment unchanged from previous. Patient and family both verbalizes understanding of discharge instructions and written instructions also given to patient. Questions and concerns addressed at this time. Denies any needs at this time. IV d/c'd.

## 2020-08-31 NOTE — ANESTHESIA PROCEDURE NOTES
Peripheral Block    Patient location during procedure: pre-op  Start time: 8/31/2020 10:09 AM  End time: 8/31/2020 10:15 AM  Staffing  Anesthesiologist: Edu Ferro MD  Performed: anesthesiologist   Preanesthetic Checklist  Completed: patient identified, site marked, surgical consent, pre-op evaluation, timeout performed, IV checked, risks and benefits discussed, monitors and equipment checked, anesthesia consent given, oxygen available and patient being monitored  Peripheral Block  Patient position: supine  Prep: ChloraPrep  Patient monitoring: continuous pulse ox and IV access  Block type: Obturator  Laterality: left  Injection technique: single-shot  Procedures: ultrasound guided  Provider prep: mask and sterile gloves  Needle  Needle gauge: 21 G  Needle length: 10 cm  Test dose: negative  Assessment  Injection assessment: negative aspiration for heme and no paresthesia on injection  Paresthesia pain: none  Slow fractionated injection: yes  Hemodynamics: stable  Additional Notes  Pt. agrees to risks, benefits and alternatives to block  Block performed at the request of the surgeon for post-operative pain management   Immediately prior to procedure a \"time out\" was called to verify the correct patient, procedure, equipment, support staff. Site/side marked as required  Side: left  Site/Approach: Medial Most Inguinal Crease  Position: Supine  Sedation: Midazolam 2 mg  +  Fentanyl  100  mcg  IV  Local Anesthetic Dose:  2% Lido  2 ml sc  Aseptic technique: prepped with chlorhexidine  Ultrasound:   yes, see attached image  Local Anesthetic: 0.5% Bupivacaine with Epi 1:200KAmount: 5 ml  in 5 ml increments after negative aspiration each time at the anterior and posterior branches (10 ml total). Easy injection w/o resistance and w/o pain/paresthesias. Pt tolerated procedure well. No complications.   Reason for block: post-op pain management and at surgeon's request

## 2020-09-10 ENCOUNTER — OFFICE VISIT (OUTPATIENT)
Dept: ORTHOPEDIC SURGERY | Age: 75
End: 2020-09-10

## 2020-09-10 VITALS — HEIGHT: 65 IN | WEIGHT: 149.91 LBS | RESPIRATION RATE: 12 BRPM | BODY MASS INDEX: 24.98 KG/M2

## 2020-09-10 PROCEDURE — 99024 POSTOP FOLLOW-UP VISIT: CPT | Performed by: ORTHOPAEDIC SURGERY

## 2020-09-10 RX ORDER — HYDROCODONE BITARTRATE AND ACETAMINOPHEN 7.5; 325 MG/1; MG/1
1 TABLET ORAL EVERY 6 HOURS PRN
Qty: 28 TABLET | Refills: 0 | Status: SHIPPED | OUTPATIENT
Start: 2020-09-10 | End: 2020-09-17

## 2020-09-10 NOTE — PROGRESS NOTES
FOLLOW-UP VISIT      The patient returns for follow-up s/p left knee video arthroscopy with partial medial meniscectomy and sub-chondroplasty of the medial tibial plateau    Date of Surgery    8/31/2020    Wound Status    Sutures Removed, Incisions are healing well with no surrounding erythema, and minimal ecchymosis. Exam    She has no signs of infection DVT. She is walking without ambulatory aids. She has trace effusion. She is happy with the result to date. Plan    Suture removal and slow return to normal activity and refill pain medication today. I advised her to be careful with things like Advil that she has been prescribed by other providers because of her use of Xarelto.     Follow-up Appointment    4 weeks PRN

## 2020-09-18 NOTE — TELEPHONE ENCOUNTER
Pt last OV 5/13/20. The medication was d/c by another clinician on 12/4/19, but medication was started on 6/26/19 by ASHLEY. Not sure which clinician d/c the medication? Please advise, TY  06/26/19 OV:  Plan: 1. Tonnie Felty in view of syncope and CP with abnormal EKG showing anterior wall ischemia  2. Start Mirtazipine 15mg nightly for anxiety. Stop taking trazodone   3. Obtain labs from [facility]   4. Follow up in 6 months   Hydrate well yourself   Avoid caffeine   If episodes of dizziness sit down  Avoid straining during BM  CBC AMP LIPIDs OK from 6.19.19  This note was scribed in the presence of Yanira Kc MD by Vi Denton RN    05/13/20 OV   Plan:  1. Meds reviewed. Refills as warranted   2. Ok to proceed with scheduled back surgery with Dr. Drexel Schirmer. Stable cardiac status. No further testing warranted. 3. Recommend resuming Lipitor   4. Follow up with me in 6 months   5 We can discuss further after back surgery and resuming anticoagulation. Risks of thromboembolism discussed with her.

## 2020-09-19 RX ORDER — MIRTAZAPINE 15 MG/1
TABLET, FILM COATED ORAL
Qty: 30 TABLET | Refills: 5 | Status: SHIPPED | OUTPATIENT
Start: 2020-09-19

## 2020-10-08 ENCOUNTER — OFFICE VISIT (OUTPATIENT)
Dept: ORTHOPEDIC SURGERY | Age: 75
End: 2020-10-08

## 2020-10-08 PROCEDURE — 99024 POSTOP FOLLOW-UP VISIT: CPT | Performed by: ORTHOPAEDIC SURGERY

## 2020-10-08 RX ORDER — TIZANIDINE 4 MG/1
4 TABLET ORAL 3 TIMES DAILY
Qty: 90 TABLET | Refills: 0 | Status: SHIPPED | OUTPATIENT
Start: 2020-10-08

## 2020-10-08 NOTE — PROGRESS NOTES
FOLLOW-UP VISIT      The patient returns for follow-up s/p left knee video arthroscopy with partial medial meniscectomy and sub-chondroplasty of the medial tibial plateau    Date of Surgery    8/31/2020    Wound Status     Incisions are healing well with no surrounding erythema, and minimal ecchymosis. Exam    She has no signs of infection DVT. She is doing fairly well now but does have some pain at night.     Plan    I will give her some Zanaflex to take at night and otherwise do activity as tolerated    Follow-up Appointment     PRN

## 2020-11-23 ENCOUNTER — OFFICE VISIT (OUTPATIENT)
Dept: CARDIOLOGY CLINIC | Age: 75
End: 2020-11-23
Payer: MEDICARE

## 2020-11-23 VITALS
DIASTOLIC BLOOD PRESSURE: 80 MMHG | SYSTOLIC BLOOD PRESSURE: 132 MMHG | TEMPERATURE: 96.9 F | WEIGHT: 139.12 LBS | HEART RATE: 89 BPM | HEIGHT: 65 IN | OXYGEN SATURATION: 98 % | BODY MASS INDEX: 23.18 KG/M2

## 2020-11-23 PROCEDURE — 3017F COLORECTAL CA SCREEN DOC REV: CPT | Performed by: INTERNAL MEDICINE

## 2020-11-23 PROCEDURE — 4040F PNEUMOC VAC/ADMIN/RCVD: CPT | Performed by: INTERNAL MEDICINE

## 2020-11-23 PROCEDURE — 1123F ACP DISCUSS/DSCN MKR DOCD: CPT | Performed by: INTERNAL MEDICINE

## 2020-11-23 PROCEDURE — G8420 CALC BMI NORM PARAMETERS: HCPCS | Performed by: INTERNAL MEDICINE

## 2020-11-23 PROCEDURE — 1090F PRES/ABSN URINE INCON ASSESS: CPT | Performed by: INTERNAL MEDICINE

## 2020-11-23 PROCEDURE — 99214 OFFICE O/P EST MOD 30 MIN: CPT | Performed by: INTERNAL MEDICINE

## 2020-11-23 PROCEDURE — G8427 DOCREV CUR MEDS BY ELIG CLIN: HCPCS | Performed by: INTERNAL MEDICINE

## 2020-11-23 PROCEDURE — G8399 PT W/DXA RESULTS DOCUMENT: HCPCS | Performed by: INTERNAL MEDICINE

## 2020-11-23 PROCEDURE — 1036F TOBACCO NON-USER: CPT | Performed by: INTERNAL MEDICINE

## 2020-11-23 PROCEDURE — G8484 FLU IMMUNIZE NO ADMIN: HCPCS | Performed by: INTERNAL MEDICINE

## 2020-11-23 RX ORDER — ATORVASTATIN CALCIUM 10 MG/1
10 TABLET, FILM COATED ORAL DAILY
Qty: 90 TABLET | Refills: 3 | Status: SHIPPED | OUTPATIENT
Start: 2020-11-23

## 2020-11-23 RX ORDER — AMLODIPINE BESYLATE 5 MG/1
5 TABLET ORAL DAILY
Qty: 90 TABLET | Refills: 3 | Status: SHIPPED | OUTPATIENT
Start: 2020-11-23

## 2020-11-23 NOTE — PROGRESS NOTES
[address] Office Note  11/23/2020      Subjective:  Ms. Lavelle Fierro is being seen for  HTN, HLD, persistent AFIB      [place]:   Today she reports cataract surgery in 1/2021. Denies chest pain, shortness of breath, edema, dizziness, palpitations and syncope. PMH    HTN, HLD, AFIB with controlled rates, DVT year ago, varicose veins, GERD,  Anxiety, MICROLUMBAR DISCECTOMY L5-S1 on 5/29/20,Left knee video arthroscopy with partial medial  meniscectomy and internal fixation of an insufficiency fracture of the medial tibial plateau with bone substitute on 8/31/20 . She reports for years having irregular heart beat. She reports s/p PFO 30 years ago open surgery with pericardial patch by Dr Jer Hinojosa. She reports stress test 2013 in [facility] was reportedly normal.  she reports on 6/23/19 she woke up to use bathroom. She was bearing down to have BM. She was lightheaded went to the kitchen for a cookie. She passed out and slid down the cabinets. She reports a couple brief episodes  of CP lasting seconds. Review of Systems:  12 point ROS negative in all areas as listed below except as in [place]  Constitutional, EENT,pulmonary, GI, , Musculoskeletal, skin, neurological, hematological, endocrine, Psychiatric      Reviewed past medical history, social, and family history. , non smoker, glass wine nightly, No CAD in Parents Dad alcoholic  She had 7 children.  Brother and sister AFIB  Past Medical History:   Diagnosis Date    Anxiety     GERD (gastroesophageal reflux disease)     Hyperlipidemia     Hypertension     Insomnia     Lumbar disc herniation      Past Surgical History:   Procedure Laterality Date   [address]    open heart for hole in heart    COLONOSCOPY      DILATION AND CURETTAGE OF UTERUS      KNEE ARTHROSCOPY Left 08/31/2020    LEFT KNEE VIDEO ARTHROSCOPY, MEDIAL MENISECTOMY, INTERNAL FIXATION MEDIAL TIBIAL PLATEAU INSUFFICIENCY FRACTURE WITH BONE SUBSTITUTE    KNEE ARTHROSCOPY Left 8/31/2020    LEFT KNEE VIDEO ARTHROSCOPY, MEDIAL MENISECTOMY, INTERNAL FIXATION MEDIAL TIBIAL PLATEAU INSUFFICIENCY FRACTURE WITH BONE SUBSTITUTE performed by Vero Gao MD at [facility] N/A 5/29/2020    MICROLUMBAR DISCECTOMY L5-S1 performed by Cyrus Trevizo MD at [place] [id number]  12/2/2014    VASCULAR SURGERY      r leg       Objective:   /80   Pulse 89   Temp 96.9 °F (36.1 °C)   Ht 5' 5\" (1.651 m)   Wt 139 lb 1.9 oz (63.1 kg)   SpO2 98%   BMI 23.15 kg/m²     Wt Readings from Last 3 Encounters:   11/23/20 139 lb 1.9 oz (63.1 kg)   09/10/20 149 lb 14.6 oz (68 kg)   08/31/20 150 lb (68 kg)       Physical Exam:  General: No Respiratory distress, appears well developed and well nourished. Eyes:  Sclera nonicteric  Nose/Sinuses:  negative findings: nose shows no deformity, asymmetry, or inflammation, nasal mucosa normal, septum midline with no perforation or bleeding  Back:  no pain to palpation  Joint:  no active joint inflammation  Musculoskeletal:  negative  Skin:  Warm and dry  Neck:  Negative for JVD and Carotid Bruits. Chest:  Clear to auscultation, respiration easy  Cardiovascular:  Irreg irreg, 76 bpm S1S2 normal, no murmur, no rub or thrill.   Extremities:   No edema, clubbing, cyanosis,superficial varicose veins in legs  Pulses: pedal pulses are normal.  Neuro: intact    Medications:   Outpatient Encounter Medications as of 11/23/2020   Medication Sig Dispense Refill    ibuprofen (ADVIL;MOTRIN) 600 MG tablet Take 400 mg by mouth every 4-6 hours as needed for Pain      losartan-hydrochlorothiazide (HYZAAR) 100-12.5 MG per tablet Take 1 tablet by mouth daily 90 tablet 3    amLODIPine (NORVASC) 5 MG tablet Take 1 tablet by mouth daily 30 tablet 11    tiZANidine (ZANAFLEX) 4 MG tablet Take 1 tablet by mouth 3 times daily (Patient not taking: Reported on 11/23/2020) 90 tablet 0    mirtazapine (REMERON) 15 MG tablet TAKE 1 TABLET BY MOUTH ONCE DAILY IN THE EVENING (Patient not taking: Reported on 11/23/2020) 30 tablet 5    XARELTO 15 MG TABS tablet TAKE 1 TABLET BY MOUTH DAILY (Patient not taking: Reported on 11/23/2020) 30 tablet 8    Prenatal Multivit-Min-Fe-FA (PRENATAL VITAMINS PO) Take 1 tablet by mouth daily       atorvastatin (LIPITOR) 10 MG tablet Take 1 tablet by mouth daily (Patient not taking: Reported on 11/23/2020) 90 tablet 3    omeprazole (PRILOSEC) 20 MG delayed release capsule Take 40 mg by mouth every other day Dr Carlos Lindsay rx       rOPINIRole (REQUIP) 1 MG tablet Take 1 mg by mouth nightly       No facility-administered encounter medications on file as of 11/23/2020. Lab Data:  CBC: No results for input(s): WBC, HGB, HCT, MCV, PLT in the last 72 hours. BMP: No results for input(s): NA, K, CL, CO2, PHOS, BUN, CREATININE in the last 72 hours. Invalid input(s): CA  LIVER PROFILE: No results for input(s): AST, ALT, LIPASE, BILIDIR, BILITOT, ALKPHOS in the last 72 hours. Invalid input(s): AMYLASE,  ALB  LIPID:   Lab Results   Component Value Date    CHOL 214 06/29/2018     Lab Results   Component Value Date    TRIG 151 06/29/2018     Lab Results   Component Value Date    HDL 52 06/29/2018     Lab Results   Component Value Date    LDLCALC 132 06/29/2018     Lab Results   Component Value Date    VLDL 30 06/29/2018     No results found for: CHOLHDLRATIO  PT/INR:   No results for input(s): PROTIME, INR in the last 72 hours. A1C: No results found for: LABA1C  BNP:  No results for input(s): BNP in the last 72 hours. IMAGING:   EKG 8/31/20   Atrial fibrillation Controlled ventricular rateLeft axis deviationLeft bundle branch blockAbnormal ECGNo previous ECGs availableConfirmed by Sharon Rosales MD, [address] ([id number]) on 8/31/2020 4:28:15 PM    EKG 5/13/20   Afib, LBBB afib rate controlled     EKG 6/26/19  afib rate control.  T wave inversion consider anterior  ischemia    ECHO 5/15/17  Summary   Normal LV systolic Yes  3. Documentation to PCP/referring for new patient:  Sent to PCP at close of office visit  4. CAD patient on anti-platelet: no  5. CAD patient on STATIN therapy:  She will restart it  6.  Patient with CHF and aFib on anticoagulation: yes on hold     I        [address] [facility] Radha Elizabeth MD 11/23/2020 2:23 PM

## 2020-11-23 NOTE — PATIENT INSTRUCTIONS
Plan:  1. Meds reviewed. Refills as warranted    2. Ok to stop Xarelto 48 hours before cataract surgery if  warranted by physician performing procedure   3. Follow up with me in 7 months. Have CBC, CMP, fasting lipids checked 1 week before office visit. Please bring copy of results to my office.

## 2021-01-04 LAB
ALBUMIN SERPL-MCNC: 4 G/DL
ALP BLD-CCNC: 93 U/L
ALT SERPL-CCNC: 22 U/L
ANION GAP SERPL CALCULATED.3IONS-SCNC: 13 MMOL/L
AST SERPL-CCNC: 19 U/L
BASOPHILS ABSOLUTE: 0.04 /ΜL
BASOPHILS RELATIVE PERCENT: 1 %
BILIRUB SERPL-MCNC: 1.4 MG/DL (ref 0.1–1.4)
BUN BLDV-MCNC: 20 MG/DL
CALCIUM SERPL-MCNC: 9.3 MG/DL
CHLORIDE BLD-SCNC: 105 MMOL/L
CHOLESTEROL, TOTAL: 215 MG/DL
CHOLESTEROL/HDL RATIO: NORMAL
CO2: 30 MMOL/L
CREAT SERPL-MCNC: 0.91 MG/DL
EOSINOPHILS ABSOLUTE: 0.06 /ΜL
EOSINOPHILS RELATIVE PERCENT: 1 %
GFR CALCULATED: 60
GLUCOSE BLD-MCNC: 101 MG/DL
HCT VFR BLD CALC: 46.6 % (ref 36–46)
HDLC SERPL-MCNC: 52 MG/DL (ref 35–70)
HEMOGLOBIN: 15.2 G/DL (ref 12–16)
LDL CHOLESTEROL CALCULATED: 139 MG/DL (ref 0–160)
LYMPHOCYTES ABSOLUTE: 1.66 /ΜL
LYMPHOCYTES RELATIVE PERCENT: 26 %
MCH RBC QN AUTO: 29.3 PG
MCHC RBC AUTO-ENTMCNC: 32.6 G/DL
MCV RBC AUTO: 90 FL
MONOCYTES ABSOLUTE: 0.42 /ΜL
MONOCYTES RELATIVE PERCENT: 7 %
NEUTROPHILS ABSOLUTE: 4.17 /ΜL
NEUTROPHILS RELATIVE PERCENT: 66 %
NONHDLC SERPL-MCNC: NORMAL MG/DL
PLATELET # BLD: 226 K/ΜL
PMV BLD AUTO: ABNORMAL FL
POTASSIUM SERPL-SCNC: 4 MMOL/L
RBC # BLD: 5.18 10^6/ΜL
SODIUM BLD-SCNC: 144 MMOL/L
TOTAL PROTEIN: 7
TRIGL SERPL-MCNC: 121 MG/DL
VLDLC SERPL CALC-MCNC: NORMAL MG/DL
WBC # BLD: 6.4 10^3/ML

## 2021-01-06 ENCOUNTER — TELEPHONE (OUTPATIENT)
Dept: CARDIOLOGY CLINIC | Age: 76
End: 2021-01-06

## 2021-01-06 NOTE — TELEPHONE ENCOUNTER
----- Message from Abilio Shine MD sent at 1/6/2021 12:20 PM EST -----  Cholesterol little high is she taking her cholesterol medication?

## 2021-01-06 NOTE — TELEPHONE ENCOUNTER
Spoke to pt. She said that she has not been taking her cholesterol medication since August 31,2020. However pt said that she plans on resuming it February 4,2021 after her last eye surgery is completed. Pt's last OV 11/23/20. ASHLEY please advise. Pt can be reached at [phone].       TY

## 2021-02-25 ENCOUNTER — IMMUNIZATION (OUTPATIENT)
Dept: PRIMARY CARE CLINIC | Age: 76
End: 2021-02-25
Payer: MEDICARE

## 2021-02-25 PROCEDURE — 91300 COVID-19, PFIZER VACCINE 30MCG/0.3ML DOSE: CPT | Performed by: FAMILY MEDICINE

## 2021-02-25 PROCEDURE — 0001A COVID-19, PFIZER VACCINE 30MCG/0.3ML DOSE: CPT | Performed by: FAMILY MEDICINE

## 2021-03-18 ENCOUNTER — IMMUNIZATION (OUTPATIENT)
Dept: PRIMARY CARE CLINIC | Age: 76
End: 2021-03-18
Payer: MEDICARE

## 2021-03-18 PROCEDURE — 0002A COVID-19, PFIZER VACCINE 30MCG/0.3ML DOSE: CPT | Performed by: FAMILY MEDICINE

## 2021-03-18 PROCEDURE — 91300 COVID-19, PFIZER VACCINE 30MCG/0.3ML DOSE: CPT | Performed by: FAMILY MEDICINE

## 2021-05-19 RX ORDER — LOSARTAN POTASSIUM AND HYDROCHLOROTHIAZIDE 12.5; 1 MG/1; MG/1
1 TABLET ORAL DAILY
Qty: 90 TABLET | Refills: 3 | Status: SHIPPED | OUTPATIENT
Start: 2021-05-19

## 2022-03-18 RX ORDER — AMLODIPINE BESYLATE 5 MG/1
5 TABLET ORAL DAILY
Qty: 30 TABLET | Refills: 0 | OUTPATIENT
Start: 2022-03-18
